# Patient Record
Sex: FEMALE | Race: WHITE | Employment: FULL TIME | URBAN - METROPOLITAN AREA
[De-identification: names, ages, dates, MRNs, and addresses within clinical notes are randomized per-mention and may not be internally consistent; named-entity substitution may affect disease eponyms.]

---

## 2018-12-04 ENCOUNTER — TRANSFERRED RECORDS (OUTPATIENT)
Dept: HEALTH INFORMATION MANAGEMENT | Facility: CLINIC | Age: 38
End: 2018-12-04

## 2019-03-05 ENCOUNTER — MEDICAL CORRESPONDENCE (OUTPATIENT)
Dept: HEALTH INFORMATION MANAGEMENT | Facility: CLINIC | Age: 39
End: 2019-03-05

## 2019-03-05 ENCOUNTER — TRANSFERRED RECORDS (OUTPATIENT)
Dept: HEALTH INFORMATION MANAGEMENT | Facility: CLINIC | Age: 39
End: 2019-03-05

## 2019-03-15 ENCOUNTER — PRE VISIT (OUTPATIENT)
Dept: MATERNAL FETAL MEDICINE | Facility: CLINIC | Age: 39
End: 2019-03-15

## 2019-03-19 ENCOUNTER — TELEPHONE (OUTPATIENT)
Dept: MATERNAL FETAL MEDICINE | Facility: CLINIC | Age: 39
End: 2019-03-19

## 2019-03-19 NOTE — TELEPHONE ENCOUNTER
Patient was referred over to [facility] from [place]. Patient no showed her appt. On 3/19. Writer spoke to Jackie from [place] and she said she will let provider know. Also they will call [facility] back for updates on patient  r/s appt. Or if she declines coming here.        Referring clinic notified.   Rene Thomas,    , [facility]

## 2019-03-21 ENCOUNTER — HOSPITAL ENCOUNTER (OUTPATIENT)
Facility: CLINIC | Age: 39
End: 2019-03-21
Payer: COMMERCIAL

## 2019-04-18 ENCOUNTER — HOSPITAL ENCOUNTER (OUTPATIENT)
Dept: ULTRASOUND IMAGING | Facility: CLINIC | Age: 39
Discharge: HOME | End: 2019-04-18
Attending: MIDWIFE | Admitting: MIDWIFE
Payer: COMMERCIAL

## 2019-04-18 ENCOUNTER — OFFICE VISIT (OUTPATIENT)
Dept: INTERPRETER SERVICES | Facility: CLINIC | Age: 39
End: 2019-04-18
Payer: COMMERCIAL

## 2019-04-18 ENCOUNTER — OFFICE VISIT (OUTPATIENT)
Dept: MATERNAL FETAL MEDICINE | Facility: CLINIC | Age: 39
End: 2019-04-18
Attending: MIDWIFE
Payer: COMMERCIAL

## 2019-04-18 DIAGNOSIS — O26.90 PREGNANCY RELATED CONDITION, ANTEPARTUM: ICD-10-CM

## 2019-04-18 DIAGNOSIS — O09.523 ELDERLY MULTIGRAVIDA IN THIRD TRIMESTER: Primary | ICD-10-CM

## 2019-04-18 DIAGNOSIS — O09.30 POOR PATIENT ATTENDANCE OF ANTENATAL CARE: ICD-10-CM

## 2019-04-18 PROCEDURE — T1013 SIGN LANG/ORAL INTERPRETER: HCPCS | Mod: U3

## 2019-04-18 PROCEDURE — 76811 OB US DETAILED SNGL FETUS: CPT

## 2019-04-18 PROCEDURE — 96040 ZZH GENETIC COUNSELING, EACH 30 MINUTES: CPT | Mod: ZF | Performed by: GENETIC COUNSELOR, MS

## 2019-04-18 NOTE — PROGRESS NOTES
"Please see \"Imaging\" tab under \"Chart Review\" for details of today's US.      Saray Sotelo, DO  Maternal-Fetal Medicine        "

## 2019-04-19 NOTE — PROGRESS NOTES
[facility]  Genetic Counseling Consult    Patient:  Maureen Finnegan YOB: 1980   Date of Service:  19      Maureen Finnegan was seen at the [facility] with a  ( name: Laura Pacheco, Agency: [place], ID# [id number])  for genetic consultation as part of her appointment for comprehensive ultrasound.  The indication for genetic counseling is advanced maternal age.       Impression/Plan:   1. Maureen had a comprehensive (level II) ultrasound today.  Please see the ultrasound report for further details.    2. The patient declines genetic amniocentesis and aneuploidy screening today.    Pregnancy History:   /Parity:      Age at Delivery: 38 year old  MOUNA: 6/15/2019, by Last Menstrual Period  Gestational Age: 31w6d    No significant complications or exposures were reported in the current pregnancy.    Pregnancy history:  o 3 full term deliveries       Family History:   A three-generation pedigree was obtained, and is scanned under the  Media  tab. The reported family history is negative for multiple miscarriages, stillbirths, birth defects, mental retardation, known genetic conditions, and consanguinity.      Risk Assessment for Chromosome Conditions:   We explained that the risk for fetal chromosome abnormalities increases with maternal age. We discussed specific features of common chromosome abnormalities, including Down syndrome, trisomy 13, trisomy 18, and sex chromosome trisomies.    At age 38 at delivery, the risk to have a baby with Down syndrome is 1 in 227.    At age 38 at delivery, the risk to have a baby with any chromosome abnormality is 1 in 129.     Maureen did not have maternal serum screening earlier in pregnancy.       Testing Options:   We discussed the following options:   Non-invasive Prenatal Testing (NIPT)    Maternal plasma cell-free DNA testing;  first trimester ultrasound with nuchal translucency and nasal bone assessment is recommended, when appropriate    Screens for fetal trisomy 21, trisomy 13, trisomy 18, and sex chromosome aneuploidy    Cannot screen for open neural tube defects; maternal serum AFP after 15 weeks is recommended     Genetic Amniocentesis    Invasive procedure typically performed in the second trimester by which amniotic fluid is obtained for the purpose of chromosome analysis and/or other prenatal genetic analysis    Diagnostic results; >99% sensitivity for fetal chromosome abnormalities    AFAFP measurement tests for open neural tube defects     Comprehensive (Level II) ultrasound: Detailed ultrasound performed to screen for major birth defects and markers for aneuploidy.    We reviewed the benefits and limitations of this testing.  Screening tests provide a risk assessment specific to the pregnancy for certain fetal chromosome abnormalities, but cannot definitively diagnose or exclude a fetal chromosome abnormality.  Follow-up genetic counseling and consideration of diagnostic testing is recommended with any abnormal screening result.     Diagnostic tests carry inherent risks- including risk of miscarriage- that require careful consideration.  These tests can detect fetal chromosome abnormalities with greater than 99% certainty.  Results can be compromised by maternal cell contamination or mosaicism, and are limited by the resolution of cytogenetic G-banding technology.  There is no screening nor diagnostic test that can detect all forms of birth defects or mental disability.    It was a pleasure to be involved with Maureen s care. Face-to-face time of the meeting was 35 minutes.    Bella Huynh MS, [facility]  Maternal Fetal Medicine  [facility]  Phone:[phone]  Email: [email or web address]

## 2019-05-09 ENCOUNTER — OFFICE VISIT (OUTPATIENT)
Dept: INTERPRETER SERVICES | Facility: CLINIC | Age: 39
End: 2019-05-09

## 2019-05-09 ENCOUNTER — OFFICE VISIT (OUTPATIENT)
Dept: MATERNAL FETAL MEDICINE | Facility: CLINIC | Age: 39
End: 2019-05-09
Attending: OBSTETRICS & GYNECOLOGY
Payer: COMMERCIAL

## 2019-05-09 ENCOUNTER — HOSPITAL ENCOUNTER (OUTPATIENT)
Dept: ULTRASOUND IMAGING | Facility: CLINIC | Age: 39
Discharge: HOME | End: 2019-05-09
Attending: OBSTETRICS & GYNECOLOGY | Admitting: OBSTETRICS & GYNECOLOGY
Payer: COMMERCIAL

## 2019-05-09 DIAGNOSIS — O35.9XX0 SUSPECTED FETAL ANOMALY, ANTEPARTUM, SINGLE OR UNSPECIFIED FETUS: Primary | ICD-10-CM

## 2019-05-09 DIAGNOSIS — O09.30 POOR PATIENT ATTENDANCE OF ANTENATAL CARE: ICD-10-CM

## 2019-05-09 DIAGNOSIS — O09.523 ELDERLY MULTIGRAVIDA IN THIRD TRIMESTER: ICD-10-CM

## 2019-05-09 PROCEDURE — 76816 OB US FOLLOW-UP PER FETUS: CPT

## 2019-05-09 PROCEDURE — T1013 SIGN LANG/ORAL INTERPRETER: HCPCS | Mod: U3,ZF

## 2019-05-09 NOTE — PROGRESS NOTES
"Please see \"Imaging\" tab under \"Chart Review\" for details of today's US at the [facility].    Pernell Gaviria MD  Maternal-Fetal Medicine      "

## 2019-05-14 ENCOUNTER — HOSPITAL ENCOUNTER (OUTPATIENT)
Facility: CLINIC | Age: 39
Setting detail: OBSERVATION
Discharge: HOME | End: 2019-05-15
Attending: OBSTETRICS & GYNECOLOGY | Admitting: OBSTETRICS & GYNECOLOGY
Payer: COMMERCIAL

## 2019-05-14 DIAGNOSIS — N20.0 NEPHROLITHIASIS: Primary | ICD-10-CM

## 2019-05-14 LAB
APTT PPP: 28 SEC (ref 22–37)
ERYTHROCYTE [DISTWIDTH] IN BLOOD BY AUTOMATED COUNT: 13.4 % (ref 10–15)
FIBRINOGEN PPP-MCNC: 450 MG/DL (ref 200–420)
HCT VFR BLD AUTO: 32.8 % (ref 35–47)
HGB BLD-MCNC: 10.8 G/DL (ref 11.7–15.7)
INR PPP: 1.05 (ref 0.86–1.14)
LACTATE BLD-SCNC: 1 MMOL/L (ref 0.7–2)
MCH RBC QN AUTO: 30.3 PG (ref 26.5–33)
MCHC RBC AUTO-ENTMCNC: 32.9 G/DL (ref 31.5–36.5)
MCV RBC AUTO: 92 FL (ref 78–100)
PLATELET # BLD AUTO: 145 10E9/L (ref 150–450)
RBC # BLD AUTO: 3.57 10E12/L (ref 3.8–5.2)
WBC # BLD AUTO: 8.4 10E9/L (ref 4–11)

## 2019-05-14 PROCEDURE — 25800030 ZZH RX IP 258 OP 636: Performed by: ADVANCED PRACTICE MIDWIFE

## 2019-05-14 PROCEDURE — 96374 THER/PROPH/DIAG INJ IV PUSH: CPT

## 2019-05-14 PROCEDURE — 85730 THROMBOPLASTIN TIME PARTIAL: CPT | Performed by: ADVANCED PRACTICE MIDWIFE

## 2019-05-14 PROCEDURE — 86850 RBC ANTIBODY SCREEN: CPT | Performed by: ADVANCED PRACTICE MIDWIFE

## 2019-05-14 PROCEDURE — 85610 PROTHROMBIN TIME: CPT | Performed by: ADVANCED PRACTICE MIDWIFE

## 2019-05-14 PROCEDURE — 85384 FIBRINOGEN ACTIVITY: CPT | Performed by: ADVANCED PRACTICE MIDWIFE

## 2019-05-14 PROCEDURE — 83690 ASSAY OF LIPASE: CPT | Performed by: STUDENT IN AN ORGANIZED HEALTH CARE EDUCATION/TRAINING PROGRAM

## 2019-05-14 PROCEDURE — 80053 COMPREHEN METABOLIC PANEL: CPT | Performed by: STUDENT IN AN ORGANIZED HEALTH CARE EDUCATION/TRAINING PROGRAM

## 2019-05-14 PROCEDURE — 36415 COLL VENOUS BLD VENIPUNCTURE: CPT | Performed by: STUDENT IN AN ORGANIZED HEALTH CARE EDUCATION/TRAINING PROGRAM

## 2019-05-14 PROCEDURE — 85460 HEMOGLOBIN FETAL: CPT | Performed by: OBSTETRICS & GYNECOLOGY

## 2019-05-14 PROCEDURE — 40000268 ZZH STATISTIC NO CHARGES

## 2019-05-14 PROCEDURE — 83605 ASSAY OF LACTIC ACID: CPT | Performed by: STUDENT IN AN ORGANIZED HEALTH CARE EDUCATION/TRAINING PROGRAM

## 2019-05-14 PROCEDURE — 86901 BLOOD TYPING SEROLOGIC RH(D): CPT | Performed by: ADVANCED PRACTICE MIDWIFE

## 2019-05-14 PROCEDURE — 85027 COMPLETE CBC AUTOMATED: CPT | Performed by: ADVANCED PRACTICE MIDWIFE

## 2019-05-14 PROCEDURE — 86900 BLOOD TYPING SEROLOGIC ABO: CPT | Performed by: ADVANCED PRACTICE MIDWIFE

## 2019-05-14 PROCEDURE — 25000128 H RX IP 250 OP 636

## 2019-05-14 RX ORDER — SODIUM CHLORIDE, SODIUM LACTATE, POTASSIUM CHLORIDE, CALCIUM CHLORIDE 600; 310; 30; 20 MG/100ML; MG/100ML; MG/100ML; MG/100ML
INJECTION, SOLUTION INTRAVENOUS CONTINUOUS
Status: DISCONTINUED | OUTPATIENT
Start: 2019-05-15 | End: 2019-05-15 | Stop reason: HOSPADM

## 2019-05-14 RX ORDER — MISOPROSTOL 200 UG/1
TABLET ORAL
Status: DISPENSED
Start: 2019-05-14 | End: 2019-05-15

## 2019-05-14 RX ORDER — FENTANYL CITRATE 50 UG/ML
50 INJECTION, SOLUTION INTRAMUSCULAR; INTRAVENOUS ONCE
Status: COMPLETED | OUTPATIENT
Start: 2019-05-14 | End: 2019-05-14

## 2019-05-14 RX ORDER — LIDOCAINE HYDROCHLORIDE 10 MG/ML
INJECTION, SOLUTION EPIDURAL; INFILTRATION; INTRACAUDAL; PERINEURAL
Status: DISCONTINUED
Start: 2019-05-14 | End: 2019-05-15 | Stop reason: WASHOUT

## 2019-05-14 RX ORDER — ONDANSETRON 2 MG/ML
4 INJECTION INTRAMUSCULAR; INTRAVENOUS EVERY 6 HOURS PRN
Status: DISCONTINUED | OUTPATIENT
Start: 2019-05-14 | End: 2019-05-15 | Stop reason: HOSPADM

## 2019-05-14 RX ORDER — OXYTOCIN 10 [USP'U]/ML
INJECTION, SOLUTION INTRAMUSCULAR; INTRAVENOUS
Status: DISCONTINUED
Start: 2019-05-14 | End: 2019-05-15 | Stop reason: WASHOUT

## 2019-05-14 RX ORDER — OXYTOCIN/0.9 % SODIUM CHLORIDE 30/500 ML
PLASTIC BAG, INJECTION (ML) INTRAVENOUS
Status: DISCONTINUED
Start: 2019-05-14 | End: 2019-05-15 | Stop reason: WASHOUT

## 2019-05-14 RX ORDER — NALOXONE HYDROCHLORIDE 0.4 MG/ML
.1-.4 INJECTION, SOLUTION INTRAMUSCULAR; INTRAVENOUS; SUBCUTANEOUS
Status: DISCONTINUED | OUTPATIENT
Start: 2019-05-14 | End: 2019-05-15 | Stop reason: HOSPADM

## 2019-05-14 RX ORDER — ALUMINA, MAGNESIA, AND SIMETHICONE 2400; 2400; 240 MG/30ML; MG/30ML; MG/30ML
30 SUSPENSION ORAL
Status: DISCONTINUED | OUTPATIENT
Start: 2019-05-14 | End: 2019-05-15 | Stop reason: HOSPADM

## 2019-05-14 RX ORDER — FENTANYL CITRATE 50 UG/ML
INJECTION, SOLUTION INTRAMUSCULAR; INTRAVENOUS
Status: COMPLETED
Start: 2019-05-14 | End: 2019-05-14

## 2019-05-14 RX ORDER — FENTANYL CITRATE 50 UG/ML
50 INJECTION, SOLUTION INTRAMUSCULAR; INTRAVENOUS
Status: DISCONTINUED | OUTPATIENT
Start: 2019-05-14 | End: 2019-05-15 | Stop reason: HOSPADM

## 2019-05-14 RX ADMIN — SODIUM CHLORIDE, POTASSIUM CHLORIDE, SODIUM LACTATE AND CALCIUM CHLORIDE 500 ML: 600; 310; 30; 20 INJECTION, SOLUTION INTRAVENOUS at 23:58

## 2019-05-14 RX ADMIN — FENTANYL CITRATE 50 MCG: 50 INJECTION, SOLUTION INTRAMUSCULAR; INTRAVENOUS at 23:31

## 2019-05-14 RX ADMIN — FENTANYL CITRATE 50 MCG: 50 INJECTION INTRAMUSCULAR; INTRAVENOUS at 23:31

## 2019-05-14 NOTE — LETTER
May 15, 2019      RE: Maureen Finnegan  [address]    [facility] [address]       To whom it may concern:    Maureen Finnegan was seen on 5/14-5/15/2019. Please excuse her from work on these days.     She is able to return to work on 5/16/2019.         Sincerely,          Tamika Devries MD

## 2019-05-15 ENCOUNTER — APPOINTMENT (OUTPATIENT)
Dept: ULTRASOUND IMAGING | Facility: CLINIC | Age: 39
End: 2019-05-15
Payer: COMMERCIAL

## 2019-05-15 ENCOUNTER — OFFICE VISIT (OUTPATIENT)
Dept: INTERPRETER SERVICES | Facility: CLINIC | Age: 39
End: 2019-05-15
Payer: COMMERCIAL

## 2019-05-15 VITALS
RESPIRATION RATE: 20 BRPM | BODY MASS INDEX: 21.97 KG/M2 | HEART RATE: 73 BPM | HEIGHT: 65 IN | DIASTOLIC BLOOD PRESSURE: 51 MMHG | TEMPERATURE: 98 F | SYSTOLIC BLOOD PRESSURE: 92 MMHG

## 2019-05-15 PROBLEM — R10.9 ABDOMINAL PAIN AFFECTING PREGNANCY: Status: ACTIVE | Noted: 2019-05-15

## 2019-05-15 PROBLEM — O26.899 ABDOMINAL PAIN AFFECTING PREGNANCY: Status: ACTIVE | Noted: 2019-05-15

## 2019-05-15 LAB
ABO + RH BLD: NORMAL
ABO + RH BLD: NORMAL
ALBUMIN SERPL-MCNC: 2.7 G/DL (ref 3.4–5)
ALBUMIN UR-MCNC: NEGATIVE MG/DL
ALP SERPL-CCNC: 170 U/L (ref 40–150)
ALT SERPL W P-5'-P-CCNC: 21 U/L (ref 0–50)
ANION GAP SERPL CALCULATED.3IONS-SCNC: 7 MMOL/L (ref 3–14)
APPEARANCE UR: ABNORMAL
AST SERPL W P-5'-P-CCNC: 18 U/L (ref 0–45)
BACTERIA #/AREA URNS HPF: ABNORMAL /HPF
BILIRUB SERPL-MCNC: 0.2 MG/DL (ref 0.2–1.3)
BILIRUB UR QL STRIP: NEGATIVE
BLD GP AB SCN SERPL QL: NORMAL
BLOOD BANK CMNT PATIENT-IMP: NORMAL
BUN SERPL-MCNC: 9 MG/DL (ref 7–30)
CALCIUM SERPL-MCNC: 8.4 MG/DL (ref 8.5–10.1)
CHLORIDE SERPL-SCNC: 108 MMOL/L (ref 94–109)
CO2 SERPL-SCNC: 22 MMOL/L (ref 20–32)
COLOR UR AUTO: YELLOW
CREAT SERPL-MCNC: 0.6 MG/DL (ref 0.52–1.04)
GFR SERPL CREATININE-BSD FRML MDRD: >90 ML/MIN/{1.73_M2}
GLUCOSE SERPL-MCNC: 112 MG/DL (ref 70–99)
GLUCOSE UR STRIP-MCNC: NEGATIVE MG/DL
HGB UR QL STRIP: NEGATIVE
KETONES UR STRIP-MCNC: NEGATIVE MG/DL
LEUKOCYTE ESTERASE UR QL STRIP: NEGATIVE
LIPASE SERPL-CCNC: 114 U/L (ref 73–393)
MUCOUS THREADS #/AREA URNS LPF: PRESENT /LPF
NITRATE UR QL: NEGATIVE
PH UR STRIP: 6.5 PH (ref 5–7)
POTASSIUM SERPL-SCNC: 3.5 MMOL/L (ref 3.4–5.3)
PROT SERPL-MCNC: 6.6 G/DL (ref 6.8–8.8)
RBC #/AREA URNS AUTO: 5 /HPF (ref 0–2)
SODIUM SERPL-SCNC: 137 MMOL/L (ref 133–144)
SOURCE: ABNORMAL
SP GR UR STRIP: 1.01 (ref 1–1.03)
SPECIMEN EXP DATE BLD: NORMAL
UROBILINOGEN UR STRIP-MCNC: NORMAL MG/DL (ref 0–2)
WBC #/AREA URNS AUTO: 5 /HPF (ref 0–5)

## 2019-05-15 PROCEDURE — 96374 THER/PROPH/DIAG INJ IV PUSH: CPT

## 2019-05-15 PROCEDURE — 96376 TX/PRO/DX INJ SAME DRUG ADON: CPT

## 2019-05-15 PROCEDURE — G0378 HOSPITAL OBSERVATION PER HR: HCPCS

## 2019-05-15 PROCEDURE — 96361 HYDRATE IV INFUSION ADD-ON: CPT

## 2019-05-15 PROCEDURE — 59025 FETAL NON-STRESS TEST: CPT

## 2019-05-15 PROCEDURE — 25000132 ZZH RX MED GY IP 250 OP 250 PS 637: Performed by: STUDENT IN AN ORGANIZED HEALTH CARE EDUCATION/TRAINING PROGRAM

## 2019-05-15 PROCEDURE — G0463 HOSPITAL OUTPT CLINIC VISIT: HCPCS | Mod: 25

## 2019-05-15 PROCEDURE — 96375 TX/PRO/DX INJ NEW DRUG ADDON: CPT

## 2019-05-15 PROCEDURE — 25800030 ZZH RX IP 258 OP 636: Performed by: ADVANCED PRACTICE MIDWIFE

## 2019-05-15 PROCEDURE — T1013 SIGN LANG/ORAL INTERPRETER: HCPCS | Mod: U3

## 2019-05-15 PROCEDURE — 81001 URINALYSIS AUTO W/SCOPE: CPT | Performed by: ADVANCED PRACTICE MIDWIFE

## 2019-05-15 PROCEDURE — 25000128 H RX IP 250 OP 636: Performed by: STUDENT IN AN ORGANIZED HEALTH CARE EDUCATION/TRAINING PROGRAM

## 2019-05-15 PROCEDURE — 96360 HYDRATION IV INFUSION INIT: CPT

## 2019-05-15 PROCEDURE — 76770 US EXAM ABDO BACK WALL COMP: CPT

## 2019-05-15 RX ORDER — ACETAMINOPHEN 325 MG/1
650 TABLET ORAL EVERY 4 HOURS PRN
Status: DISCONTINUED | OUTPATIENT
Start: 2019-05-15 | End: 2019-05-15 | Stop reason: HOSPADM

## 2019-05-15 RX ORDER — SODIUM CHLORIDE, SODIUM LACTATE, POTASSIUM CHLORIDE, CALCIUM CHLORIDE 600; 310; 30; 20 MG/100ML; MG/100ML; MG/100ML; MG/100ML
INJECTION, SOLUTION INTRAVENOUS CONTINUOUS
Status: DISCONTINUED | OUTPATIENT
Start: 2019-05-15 | End: 2019-05-15 | Stop reason: HOSPADM

## 2019-05-15 RX ORDER — OXYCODONE HYDROCHLORIDE 5 MG/1
5-10 TABLET ORAL
Status: DISCONTINUED | OUTPATIENT
Start: 2019-05-15 | End: 2019-05-15 | Stop reason: HOSPADM

## 2019-05-15 RX ORDER — TAMSULOSIN HYDROCHLORIDE 0.4 MG/1
0.4 CAPSULE ORAL DAILY
Qty: 30 CAPSULE | Refills: 0 | Status: SHIPPED | OUTPATIENT
Start: 2019-05-16 | End: 2019-05-15

## 2019-05-15 RX ORDER — OXYCODONE HYDROCHLORIDE 5 MG/1
5 TABLET ORAL EVERY 6 HOURS PRN
Qty: 12 TABLET | Refills: 0 | Status: ON HOLD | OUTPATIENT
Start: 2019-05-15 | End: 2019-06-17

## 2019-05-15 RX ORDER — TAMSULOSIN HYDROCHLORIDE 0.4 MG/1
0.4 CAPSULE ORAL DAILY
Qty: 30 CAPSULE | Refills: 0 | Status: ON HOLD | OUTPATIENT
Start: 2019-05-16 | End: 2019-06-17

## 2019-05-15 RX ORDER — FENTANYL CITRATE 50 UG/ML
100 INJECTION, SOLUTION INTRAMUSCULAR; INTRAVENOUS ONCE
Status: COMPLETED | OUTPATIENT
Start: 2019-05-15 | End: 2019-05-15

## 2019-05-15 RX ORDER — TAMSULOSIN HYDROCHLORIDE 0.4 MG/1
0.4 CAPSULE ORAL DAILY
Status: DISCONTINUED | OUTPATIENT
Start: 2019-05-15 | End: 2019-05-15 | Stop reason: HOSPADM

## 2019-05-15 RX ORDER — TAMSULOSIN HYDROCHLORIDE 0.4 MG/1
0.4 CAPSULE ORAL DAILY
Status: DISCONTINUED | OUTPATIENT
Start: 2019-05-15 | End: 2019-05-15

## 2019-05-15 RX ADMIN — ACETAMINOPHEN 650 MG: 325 TABLET, FILM COATED ORAL at 02:14

## 2019-05-15 RX ADMIN — SODIUM CHLORIDE, POTASSIUM CHLORIDE, SODIUM LACTATE AND CALCIUM CHLORIDE: 600; 310; 30; 20 INJECTION, SOLUTION INTRAVENOUS at 04:03

## 2019-05-15 RX ADMIN — TAMSULOSIN HYDROCHLORIDE 0.4 MG: 0.4 CAPSULE ORAL at 09:04

## 2019-05-15 RX ADMIN — TAMSULOSIN HYDROCHLORIDE 0.4 MG: 0.4 CAPSULE ORAL at 02:21

## 2019-05-15 RX ADMIN — OXYCODONE HYDROCHLORIDE 5 MG: 5 TABLET ORAL at 02:14

## 2019-05-15 RX ADMIN — FENTANYL CITRATE 100 MCG: 50 INJECTION INTRAMUSCULAR; INTRAVENOUS at 00:20

## 2019-05-15 RX ADMIN — FENTANYL CITRATE 50 MCG: 50 INJECTION INTRAMUSCULAR; INTRAVENOUS at 01:19

## 2019-05-15 NOTE — PLAN OF CARE
Patient feeling much better this morning.  Patient given discharge instructions per MD, shown how to strain urine at home. Patient denies questions about new medications and verbalizes understanding. IV removed.  Patient instructed when to call clinic, specifically for concerns of pre-e, labor, decreased fetal movement, vaginal bleeding, or worsening/ongoing urinary symptoms. All discharge instructions were reviewed using an in person , patient discharged ambulatory at 1126.

## 2019-05-15 NOTE — ED NOTES
Pt arrived to ED with complaint of back and abdominal pain .  Pt reports 36 weeks pregnant.   Pt is having contractions No.   Pt feels urge to push Yes.   Pt reports water broke No.   Report was called and pt was transferred to L&D Yes.

## 2019-05-15 NOTE — PROVIDER NOTIFICATION
05/15/19 0025   Provider Notification   Provider Name/Title Dr. Longoria   Method of Notification At Bedside     Plan to perform renal U/S. Ok to come off monitor to transport down to ultrasound.

## 2019-05-15 NOTE — DISCHARGE INSTRUCTIONS
Discharge Instruction for Undelivered Patients      You were seen for: Flank Pain  We Consulted: Barbara  You had (Test or Medicine):Kidney ultrasound     Diet:   Drink 8 to 12 glasses of liquids (milk, juice, water) every day.  You may eat meals and snacks.     Activity:  Call your doctor or nurse midwife if your baby is moving less than usual.     Call your provider if you notice:  Swelling in your face or increased swelling in your hands or legs.  Headaches that are not relieved by Tylenol (acetaminophen).  Changes in your vision (blurring: seeing spots or stars.)  Nausea (sick to your stomach) and vomiting (throwing up).   Weight gain of 5 pounds or more per week.  Heartburn that doesn't go away.  Signs of bladder infection: pain when you urinate (use the toilet), need to go more often and more urgently.  The bag of herrera (rupture of membranes) breaks, or you notice leaking in your underwear.  Bright red blood in your underwear.  Abdominal (lower belly) or stomach pain.  For first baby: Contractions (tightening) less than 5 minutes apart for one hour or more.  Second (plus) baby: Contractions (tightening) less than 10 minutes apart and getting stronger.  *If less than 34 weeks: Contractions (tightenings) more than 6 times in one hour.  Increase or change in vaginal discharge (note the color and amount)  Other:     Follow-up:  As scheduled in the clinic

## 2019-05-15 NOTE — H&P
[facility]   LABOR & DELIVERY   HISTORY AND PHYSICAL    Name: Maureen Finnegan  : 1980  MRN: [id number]    Admit Date: 05/15/2019    CHIEF COMPLAINT:  Abdominal pain, flank pain    HISTORY OF PRESENT ILLNESS:  Maureen Finnegan is a 38 year old  at 35w3d by LMP c/w 12w3d US.  She presents with acute onset of abdominal pain. She states that she was driving and she suddenly had abdominal pain. The pain has been going on for about an hour. She states the pain is constant. It is mostly along her left upper abdomen and wraps around to her back. She has never had anything like this before.  She denies any trauma.  She states she is having regular bowel movements, denies blood/melena. She denies urinary symptoms, has not noticed any blood.  She denies any fevers/chills, nausea/vomiting, CP, SOB, headaches, vision changes.  She denies any sick contacts. She denies contractions, LOF or vaginal bleeding. Hasn't been feeling baby move as much since she has been in pain, but was previously active.     Pregnancy Complicated By:  - Previa (resolved), posterior succenturiate lobe, grand multiparity    OB History:  OB History    Para Term  AB Living   6 5 5 0 0 5   SAB TAB Ectopic Multiple Live Births   0 0 0 0 5      # Outcome Date GA Lbr Darshan/2nd Weight Sex Delivery Anes PTL Lv   6 Current            5 Term 11 40w1d 05:20 / 00:05 3.6 kg (7 lb 15 oz) F Vag-Spont   JACKIE      Name: RAJ FINNEGAN,GIRL BABY      Apgar1: 9  Apgar5: 9   4 Term 2003 40w0d   M Vag-Spont  N JACKIE   3 Term 2001 40w0d   F Vag-Spont  N JACKIE   2 Term 2000 40w0d   F Vag-Spont  N JACKIE   1 Term 1999 40w0d   M Vag-Spont  N JACKIE     Prenatal Lab Results:  Rh: +  SUZANNE: neg  Hgb: 10.8  Rubella: immune  HBSAg: NR  RPR: NR  HIV: NR  GCT: 123  GBS: unknown    Prenatal Ultrasounds:  - 19 @34w5d: placenta anterior with posterior succenturiate lobe, no previa, 3VC, growth  "59%  - 4/18/19 @31w5d: normal anatomy, growth wnl  - [facility] ultrasound results unavailable on Care Everywhere    Problem List:  Patient Active Problem List   Diagnosis     Labor and delivery, indication for care     HISTORY  Past Medical History:  Denies    Past Surgical History:  Denies    Family Medical History:   Non-contributory.     Social History:  Smoking status: denies  EtOH use:  denies  Drugs use: denies    Medications During Pregnancy:  PNV    Allergies:  NKDA    REVIEW OF SYSTEMS:  A 10 point review of systems was completed and was negative other than as noted in the HPI.    PHYSICAL EXAM  Vital Signs:   Patient Vitals for the past 24 hrs:   BP Temp Temp src Pulse Resp Height   05/14/19 2333 109/71 98.4  F (36.9  C) Oral 73 18 1.651 m (5' 5\")     BMI: Body mass index is 21.97 kg/m .    General: NAD  Heart: RRR  Lungs: CTAB  Abdomen: gravid, mild tenderness to palpation in LUQ and L flank, otherwise soft, non-distended, no guarding or rebound, uterus non-tender, no palpable contractions.   Back: No CVA tenderness  Extremities: no edema  Cervix: 1/L/H on CNM check    FHT:  Baseline: 145 bpm, moderate variability, accels +, no decels  [place]: 0 ctx q10min    Labs Ordered/Results Pending:  Results for orders placed or performed during the hospital encounter of 05/14/19   UA with Microscopic reflex to Culture   Result Value Ref Range    Color Urine Yellow     Appearance Urine Slightly Cloudy     Glucose Urine Negative NEG^Negative mg/dL    Bilirubin Urine Negative NEG^Negative    Ketones Urine Negative NEG^Negative mg/dL    Specific Gravity Urine 1.013 1.003 - 1.035    Blood Urine Negative NEG^Negative    pH Urine 6.5 5.0 - 7.0 pH    Protein Albumin Urine Negative NEG^Negative mg/dL    Urobilinogen mg/dL Normal 0.0 - 2.0 mg/dL    Nitrite Urine Negative NEG^Negative    Leukocyte Esterase Urine Negative NEG^Negative    Source Unspecified Urine     WBC Urine 5 0 - 5 /HPF    RBC Urine 5 (H) 0 - 2 /HPF    Bacteria " Urine Moderate (A) NEG^Negative /HPF    Mucous Urine Present (A) NEG^Negative /LPF   INR   Result Value Ref Range    INR 1.05 0.86 - 1.14   Partial thromboplastin time   Result Value Ref Range    PTT 28 22 - 37 sec   Fibrinogen activity   Result Value Ref Range    Fibrinogen 450 (H) 200 - 420 mg/dL   CBC with platelets   Result Value Ref Range    WBC 8.4 4.0 - 11.0 10e9/L    RBC Count 3.57 (L) 3.8 - 5.2 10e12/L    Hemoglobin 10.8 (L) 11.7 - 15.7 g/dL    Hematocrit 32.8 (L) 35.0 - 47.0 %    MCV 92 78 - 100 fl    MCH 30.3 26.5 - 33.0 pg    MCHC 32.9 31.5 - 36.5 g/dL    RDW 13.4 10.0 - 15.0 %    Platelet Count 145 (L) 150 - 450 10e9/L   Comprehensive metabolic panel   Result Value Ref Range    Sodium 137 133 - 144 mmol/L    Potassium 3.5 3.4 - 5.3 mmol/L    Chloride 108 94 - 109 mmol/L    Carbon Dioxide 22 20 - 32 mmol/L    Anion Gap 7 3 - 14 mmol/L    Glucose 112 (H) 70 - 99 mg/dL    Urea Nitrogen 9 7 - 30 mg/dL    Creatinine 0.60 0.52 - 1.04 mg/dL    GFR Estimate >90 >60 mL/min/[1.73_m2]    GFR Estimate If Black >90 >60 mL/min/[1.73_m2]    Calcium 8.4 (L) 8.5 - 10.1 mg/dL    Bilirubin Total 0.2 0.2 - 1.3 mg/dL    Albumin 2.7 (L) 3.4 - 5.0 g/dL    Protein Total 6.6 (L) 6.8 - 8.8 g/dL    Alkaline Phosphatase 170 (H) 40 - 150 U/L    ALT 21 0 - 50 U/L    AST 18 0 - 45 U/L   Lipase   Result Value Ref Range    Lipase 114 73 - 393 U/L   Lactic acid whole blood   Result Value Ref Range    Lactic Acid 1.0 0.7 - 2.0 mmol/L   ABO/Rh type and screen   Result Value Ref Range    ABO O     RH(D) Pos     Antibody Screen Neg     Test Valid Only At          [facility]    Specimen Expires 2019      ASSESSMENT:  38 year old  at 35w3d who presents to L&D for acute onset of abdominal pain. Differential is broad at this point, but it seems the pain is most consistent with a kidney stone. Abruption unlikely without trauma, vaginal bleeding or contractions and category 1 fetal  tracing. Coags are wnl.  Cervix is 1cm without contractions, so not in labor. She is also afebrile with a normal WBC, so infectious process like appendicitis, pyelonephritis, diverticulitis less likely. No fundal tenderness so intrauterine infection unlikely. No symptoms consistent with ulcer/GI bleed and Hgb normal for pregnancy at 10.8.  She has regular bowel movements and no nausea/vomiting, so constipation, SBO or viral process unlikely. We will admit for further work up and pain control as her pain continues to be quite significant. Overall labs are reassuring. She does have some RBCs in her urine, which again could be consistent with a kidney stone.     PLAN:    # Abdominal pain: DDx as above.   - Treating pain with PRN IV fentanyl  - Will keep NPO overnight pending work-up  - Will start with a renal ultrasound, would like to avoid CT if possible  - Renal ultrasound ordered    # Prenatal Care:  - Rh status: +, Rubella immune, Hep BSAg NR, GCT: 123, Placenta: anterior with posterior succenturiate lobe.    # Fetal Well Being:  - FHT Category I, reactive and reassuring  - Continue EFM while taking fentanyl    Discussed with Dr. Hugo Longoria MD  OBGYN PGY2  05/15/2019 12:52 AM    Appreciate Dr. Longoria's note above, patient also seen and examined by me. I agree with the note above.   Niesha Arias MD

## 2019-05-15 NOTE — PROGRESS NOTES
Patient feeling much improved now. Pain controlled with oxycodone; not requiring further IV medications. Discussed plan to strain urine at home and continue Flomax for rest of pregnancy. If worsening/uncontrolled pain, then urology would plan on US-guided L PNT placement.     Plan to discharge with oxycodone and flomax, as well as urine strainer.    Tamika Devries MD  OB/Gyn PGY-2  5/15/2019    Staff MD Note    I appreciate the note by Dr. Devries.  Any necessary changes have been made by me.  I evaluated the patient with the resident and agree with the assessment and plan.  Pain improved with Flomax and oxycodone.  Per Urology no intervention unless fails medical pain management.  Discussed plan with patient and she understands.  Will have patient come back with pain not controlled with current interventions.    Nga Jimenez MD

## 2019-05-15 NOTE — PROVIDER NOTIFICATION
05/15/19 0158   Provider Notification   Provider Name/Title Dr. Longoria   Method of Notification Phone     Provider returned call. Placed order for urology consult. Urologist will come assess pt when out of OR. Provider to order Flomax and PO roxicodone. Pt can continue to get IV fentanyl PRN. Reviewed strip with provider - contractions noted with irritability. Pt sleeping through contractions at this time.

## 2019-05-15 NOTE — PROVIDER NOTIFICATION
05/14/19 0434   Provider Notification   Provider Name/Title Dr. Arias, Dr. Longoria   Method of Notification At Bedside   Request Evaluate - Remote   Notification Reason Status Update;Patient Arrived     Providers at bedside, IPAD . Plan to draw bloodwork and give 50 mcg of fentanyl.

## 2019-05-15 NOTE — DISCHARGE SUMMARY
[facility]  Antepartum Discharge Summary    Admission Date:  2019   Discharge Date:  5/15/2019     Admission Attending: Niesha Arias MD  Discharge Attending: Nga Jimenez MD    Admission Diagnosis:  - IUP at 35w3d  - Acute onset abdominal pain  - Posterior succenturiate lobe     Discharge Diagnosis:  - Same  - Suspected nephrolithiasis  - Moderate to severe left hydronephrosis    Procedures Performed:  - Renal ultrasound    Consults:  Urology    Admission History:  Maureen Finnegan is a 38 year old  at 35w3d by LMP c/w 12w3d US.  She presents with acute onset of abdominal pain. She states that she was driving and she suddenly had abdominal pain. The pain has been going on for about an hour. She states the pain is constant. It is mostly along her left upper abdomen and wraps around to her back. She has never had anything like this before.  She denies any trauma.  She states she is having regular bowel movements, denies blood/melena. She denies urinary symptoms, has not noticed any blood.  She denies any fevers/chills, nausea/vomiting, CP, SOB, headaches, vision changes.  She denies any sick contacts. She denies contractions, LOF or vaginal bleeding. Hasn't been feeling baby move as much since she has been in pain, but was previously active.     Hospital Course:  She underwent a renal ultrasound which showed moderate to severe left hydronephrosis. She was managed symptomatically with initially IV pain medications and IV fluid hydration. Urology evaluated her and started tamsulosin and recommended symptomatic management, reserving a plan for US-guided PNT placement only if pain could not be controlled. Her pain improved and was controlled on PO oxycodone and tylenol at the time of discharge. Her fetus had a Category I FHT during her admission.     Discharge Plans:  - The patient was discharged to home  - Instructed   - PO Activity:   - No driving while taking narcotics    - She was instructed to call or return to ED if she has any of the following:    - Temperature greater than 100.4F   - Pain not controlled by pain medications   - Uncontrolled nausea/vomiting   - Vaginal bleeding soaking 1 pad per hour for 2 hours in a row     - She will follow up with her regular OB provider on 5/21. She will follow up with urology as needed if her flank pain does not resolve with conservative measures with possible repeat ultrasound after delivery.     - Discharge medications include:     Review of your medicines      START taking      Dose / Directions   oxyCODONE 5 MG tablet  Commonly known as:  ROXICODONE  Used for:  Nephrolithiasis      Dose:  5 mg  Take 1 tablet (5 mg) by mouth every 6 hours as needed for moderate to severe pain  Quantity:  12 tablet  Refills:  0     tamsulosin 0.4 MG capsule  Commonly known as:  FLOMAX  Used for:  Nephrolithiasis      Dose:  0.4 mg  Start taking on:  5/16/2019  Take 1 capsule (0.4 mg) by mouth daily  Quantity:  30 capsule  Refills:  0        CONTINUE these medicines which have NOT CHANGED      Dose / Directions   docusate sodium 100 MG capsule  Commonly known as:  COLACE  Used for:  Active labor      Dose:  100 mg  Take 1 capsule by mouth 2 times daily.  Quantity:  100 capsule  Refills:  1     Prenatal vitamin  s (Dis) Tabs  Used for:  Active labor      Dose:  1 tablet  Take 1 tablet by mouth daily.  Quantity:  30 tablet  Refills:  1           Where to get your medicines      These medications were sent to [facility] [id number] AT [address] [address]    Phone:  [phone]     tamsulosin 0.4 MG capsule     Some of these will need a paper prescription and others can be bought over the counter. Ask your nurse if you have questions.    Bring a paper prescription for each of these medications    oxyCODONE 5 MG tablet           Tamika Devries MD  OB/Gyn  PGY-2  5/15/2019    Staff MD Note    I evaluated the patient on the day of discharge.  We reviewed discharge instructions.  Patient stable for discharge.    Nga Jimenez MD

## 2019-05-15 NOTE — PROGRESS NOTES
Called to room 44 for potential delivery of 35w 3d . Pt c/o diffuse and severe abdominal pain, especially on left side. SVE 1/long/high. No contractions, no LOF, vaginal bleeding, or decrease in fetal movement. Pain is constant and started one hour ago with no precipitating injury or activity. MDs paged to room to assess.  VERÓNICA to MD team per Dr. Arias.     Orin Lira CNM

## 2019-05-15 NOTE — CONSULTS
Urology Consult    Name: Maureen Finnegan    MRN: [id number]   YOB: 1980       We were asked to see Maureen Finnegan at the request of Dr. Gray for evaluation and treatment of the following chief complaint.        Chief Complaint:   Left Hydronephrosis and left flank pain     History is obtained from the patient          History of Present Illness:   Maureen Finnegan is a 38 year old Chinese speaking  at 35w3d with no past medical or urologic history who we are consulted for evaluation of left flank pain.  Patient presented to the ED yesterday evening with sudden onset of left acute flank pain with associated nausea vomiting.  She denied any trauma dysuria or hematuria at that time.  In the ED, patient is afebrile, normal creatinine, without evidence of leukocytosis.  UA showed 5 RBCs and some bacteria but otherwise negative.  An ultrasound was obtained which showed moderate to severe left-sided hydronephrosis with no obvious stone seen.  Patient does not have a history of stones or any other urologic conditions.  She was admitted for pain control and states that since implementing her current pain regimen her pain is better tolerated.  She is also started on Flomax as well.          Past Medical History:     Past Medical History:   Diagnosis Date     PPD positive     cxr negative            Past Surgical History:   History reviewed. No pertinent surgical history.         Social History:     Social History     Tobacco Use     Smoking status: Never Smoker     Smokeless tobacco: Never Used   Substance Use Topics     Alcohol use: No            Family History:   History reviewed. No pertinent family history.         Allergies:   No Known Allergies         Medications:     Current Facility-Administered Medications   Medication     acetaminophen (TYLENOL) tablet 650 mg     alum & mag hydroxide-simethicone (MYLANTA ES/MAALOX  ES) suspension 30 mL     fentaNYL  (PF) (SUBLIMAZE) injection 50 mcg     lactated ringers infusion     lactated ringers infusion     naloxone (NARCAN) injection 0.1-0.4 mg     NO Rho (D) immune globulin (RhoGam) needed - mother Rh NEGATIVE - NOT Clinically indicated at this time     ondansetron (ZOFRAN) injection 4 mg     oxyCODONE (ROXICODONE) tablet 5-10 mg     tamsulosin (FLOMAX) capsule 0.4 mg     Current Outpatient Medications   Medication Sig     oxyCODONE (ROXICODONE) 5 MG tablet Take 1 tablet (5 mg) by mouth every 6 hours as needed for moderate to severe pain     Prenatal Vitamins (DIS) TABS Take 1 tablet by mouth daily.     [START ON 5/16/2019] tamsulosin (FLOMAX) 0.4 MG capsule Take 1 capsule (0.4 mg) by mouth daily     docusate sodium (COLACE) 100 MG capsule Take 1 capsule by mouth 2 times daily.             Review of Systems:    ROS: 10 point ROS neg other than the symptoms noted above in the HPI           Physical Exam:   VS:  T: 98    HR: 73    BP: 92/51    RR: 20   GEN:  AOx3.  NAD.    CV:  RRR  LUNGS: Non-labored breathing.   BACK:  No midline or CVA tenderness.  ABD:  Soft.  NT.  ND.  No rebound or guarding.  No masses.  SKIN:  Warm.  Dry.  No rashes.  NEURO:  CN grossly intact.            Data:   All laboratory data reviewed:    Recent Labs   Lab 05/14/19  2315   WBC 8.4   HGB 10.8*   *     Recent Labs   Lab 05/14/19  2335      POTASSIUM 3.5   CHLORIDE 108   CO2 22   BUN 9   CR 0.60   *   MICHELA 8.4*     Recent Labs   Lab 05/15/19  0012   COLOR Yellow   APPEARANCE Slightly Cloudy   URINEGLC Negative   URINEBILI Negative   URINEKETONE Negative   SG 1.013   URINEPH 6.5   PROTEIN Negative   NITRITE Negative   LEUKEST Negative   RBCU 5*   WBCU 5       All pertinent imaging reviewed:    Results for orders placed or performed during the hospital encounter of 05/14/19   US Renal Complete    Narrative    EXAMINATION: US RENAL COMPLETE, 5/15/2019 12:53 AM     COMPARISON: None.    HISTORY: Severe abdominal pain in  pregnancy, not labored. Suspect  kidney stone    FINDINGS:    Right kidney: Measures 12.4 cm in length. Parenchyma is of normal  thickness and echogenicity. No focal mass. No hydronephrosis. No renal  calculus.    Left kidney: Measures 12.5 cm in length. Parenchyma is of normal  thickness and echogenicity. No focal mass. Moderate left  hydronephrosis. No renal calculus identified, but this does not rule  out a stone.    Bladder: Incompletely evaluated        Impression    IMPRESSION:  Moderate to severe left hydronephrosis.    I have personally reviewed the examination and initial interpretation  and I agree with the findings.    RADHA BARRETT MD                Impression and Plan:   Impression:   Maureen Finnegan is a 38 year old -0-0-5 at 31 weeks 3 days by last menstrual period admitted with left renal colic secondary to left hydronephrosis of unknown etiology.  At this time leading diagnosis would include a ureteral stone due to the sudden onset of symptoms.  But in light of pregnancy unable to obtain a CT scan for definitive diagnosis.  Due to this, we would recommend conservative management i.e. pain control, straining urine, staying well-hydrated-with plans for follow-up as an outpatient with a repeat renal ultrasound at time of delivery to reassess.  If renal ultrasound still shows persistent hydronephrosis with then elected for CT noncontrast for definitive anatomic work-up.  If patient were to fail conservative management we would then recommend placement of a left-sided percutaneous nephrostomy tube with ultrasound guidance by interventional radiology.      Plan:     -No acute urologic intervention indicated at this time    -Continue conservative management    -Please obtain renal ultrasound immediately postpartum to assess if hydro-is still present  -If patient fails conservative treatment, would then recommend nephrostomy tube placement with plan for definitive diagnostic work-up  after birth of her child  -Please send patient home with strainer and continue on Flomax 0.4 mg daily    - Urology will sign off. Please contact resident/PA on call with any questions or concerns.         This patient's exam findings, labs, and imaging discussed with urology staff surgeon Dr. Field, who developed the treatment plan.    Sharron Covarrubias MD  Urology Resident PGY-4

## 2019-05-16 LAB — HGB F BLD QL KLEIH BETKE: NORMAL

## 2019-05-17 LAB — HGB F BLD QL KLEIH BETKE: NORMAL

## 2019-06-16 ENCOUNTER — HOSPITAL ENCOUNTER (INPATIENT)
Facility: CLINIC | Age: 39
LOS: 1 days | Discharge: HOME | End: 2019-06-17
Attending: FAMILY MEDICINE | Admitting: FAMILY MEDICINE
Payer: COMMERCIAL

## 2019-06-16 DIAGNOSIS — Z30.09 GENERAL COUNSELING FOR PRESCRIPTION OF ORAL CONTRACEPTIVES: Primary | ICD-10-CM

## 2019-06-16 PROBLEM — Z78.9 NOT IMMUNE TO HEPATITIS B VIRUS: Status: ACTIVE | Noted: 2019-03-07

## 2019-06-16 PROBLEM — O09.899 SUPERVISION OF OTHER HIGH RISK PREGNANCIES, UNSPECIFIED TRIMESTER: Status: ACTIVE | Noted: 2018-11-28

## 2019-06-16 PROBLEM — O09.529 ANTEPARTUM MULTIGRAVIDA OF ADVANCED MATERNAL AGE: Status: ACTIVE | Noted: 2018-11-30

## 2019-06-16 PROBLEM — E55.9 VITAMIN D DEFICIENCY: Status: ACTIVE | Noted: 2018-12-11

## 2019-06-16 PROBLEM — O09.32 INSUFFICIENT PRENATAL CARE, SECOND TRIMESTER: Status: ACTIVE | Noted: 2019-03-07

## 2019-06-16 LAB
ABO + RH BLD: NORMAL
ABO + RH BLD: NORMAL
HGB BLD-MCNC: 12.8 G/DL (ref 11.7–15.7)
PLATELET # BLD AUTO: 158 10E9/L (ref 150–450)
SPECIMEN EXP DATE BLD: NORMAL
T PALLIDUM AB SER QL: NONREACTIVE

## 2019-06-16 PROCEDURE — 85018 HEMOGLOBIN: CPT | Performed by: FAMILY MEDICINE

## 2019-06-16 PROCEDURE — G0463 HOSPITAL OUTPT CLINIC VISIT: HCPCS

## 2019-06-16 PROCEDURE — 10907ZC DRAINAGE OF AMNIOTIC FLUID, THERAPEUTIC FROM PRODUCTS OF CONCEPTION, VIA NATURAL OR ARTIFICIAL OPENING: ICD-10-PCS | Performed by: FAMILY MEDICINE

## 2019-06-16 PROCEDURE — 72200001 ZZH LABOR CARE VAGINAL DELIVERY SINGLE

## 2019-06-16 PROCEDURE — 86780 TREPONEMA PALLIDUM: CPT | Performed by: FAMILY MEDICINE

## 2019-06-16 PROCEDURE — 25000132 ZZH RX MED GY IP 250 OP 250 PS 637: Performed by: FAMILY MEDICINE

## 2019-06-16 PROCEDURE — 85049 AUTOMATED PLATELET COUNT: CPT | Performed by: FAMILY MEDICINE

## 2019-06-16 PROCEDURE — 25000125 ZZHC RX 250: Performed by: FAMILY MEDICINE

## 2019-06-16 PROCEDURE — 12000001 ZZH R&B MED SURG/OB UMMC

## 2019-06-16 PROCEDURE — 86901 BLOOD TYPING SEROLOGIC RH(D): CPT | Performed by: FAMILY MEDICINE

## 2019-06-16 PROCEDURE — 86900 BLOOD TYPING SEROLOGIC ABO: CPT | Performed by: FAMILY MEDICINE

## 2019-06-16 RX ORDER — LIDOCAINE HYDROCHLORIDE 10 MG/ML
INJECTION, SOLUTION EPIDURAL; INFILTRATION; INTRACAUDAL; PERINEURAL
Status: DISCONTINUED
Start: 2019-06-16 | End: 2019-06-16 | Stop reason: HOSPADM

## 2019-06-16 RX ORDER — LANOLIN 100 %
OINTMENT (GRAM) TOPICAL
Status: DISCONTINUED | OUTPATIENT
Start: 2019-06-16 | End: 2019-06-17 | Stop reason: HOSPADM

## 2019-06-16 RX ORDER — IBUPROFEN 800 MG/1
800 TABLET, FILM COATED ORAL
Status: COMPLETED | OUTPATIENT
Start: 2019-06-16 | End: 2019-06-16

## 2019-06-16 RX ORDER — OXYTOCIN/0.9 % SODIUM CHLORIDE 30/500 ML
PLASTIC BAG, INJECTION (ML) INTRAVENOUS
Status: DISCONTINUED
Start: 2019-06-16 | End: 2019-06-16 | Stop reason: HOSPADM

## 2019-06-16 RX ORDER — MISOPROSTOL 200 UG/1
400 TABLET ORAL
Status: DISCONTINUED | OUTPATIENT
Start: 2019-06-16 | End: 2019-06-17 | Stop reason: HOSPADM

## 2019-06-16 RX ORDER — METHYLERGONOVINE MALEATE 0.2 MG/ML
200 INJECTION INTRAVENOUS
Status: DISCONTINUED | OUTPATIENT
Start: 2019-06-16 | End: 2019-06-17 | Stop reason: HOSPADM

## 2019-06-16 RX ORDER — ACETAMINOPHEN 325 MG/1
650 TABLET ORAL EVERY 4 HOURS PRN
Status: DISCONTINUED | OUTPATIENT
Start: 2019-06-16 | End: 2019-06-16

## 2019-06-16 RX ORDER — NALOXONE HYDROCHLORIDE 0.4 MG/ML
.1-.4 INJECTION, SOLUTION INTRAMUSCULAR; INTRAVENOUS; SUBCUTANEOUS
Status: DISCONTINUED | OUTPATIENT
Start: 2019-06-16 | End: 2019-06-17 | Stop reason: HOSPADM

## 2019-06-16 RX ORDER — CARBOPROST TROMETHAMINE 250 UG/ML
250 INJECTION, SOLUTION INTRAMUSCULAR
Status: DISCONTINUED | OUTPATIENT
Start: 2019-06-16 | End: 2019-06-16

## 2019-06-16 RX ORDER — HYDROCORTISONE 2.5 %
CREAM (GRAM) TOPICAL 3 TIMES DAILY PRN
Status: DISCONTINUED | OUTPATIENT
Start: 2019-06-16 | End: 2019-06-17 | Stop reason: HOSPADM

## 2019-06-16 RX ORDER — OXYTOCIN 10 [USP'U]/ML
10 INJECTION, SOLUTION INTRAMUSCULAR; INTRAVENOUS
Status: DISCONTINUED | OUTPATIENT
Start: 2019-06-16 | End: 2019-06-17 | Stop reason: HOSPADM

## 2019-06-16 RX ORDER — MISOPROSTOL 200 UG/1
TABLET ORAL
Status: DISCONTINUED
Start: 2019-06-16 | End: 2019-06-16 | Stop reason: HOSPADM

## 2019-06-16 RX ORDER — OXYTOCIN/0.9 % SODIUM CHLORIDE 30/500 ML
100 PLASTIC BAG, INJECTION (ML) INTRAVENOUS CONTINUOUS
Status: DISCONTINUED | OUTPATIENT
Start: 2019-06-16 | End: 2019-06-17 | Stop reason: HOSPADM

## 2019-06-16 RX ORDER — AMOXICILLIN 250 MG
2 CAPSULE ORAL 2 TIMES DAILY
Status: DISCONTINUED | OUTPATIENT
Start: 2019-06-16 | End: 2019-06-17 | Stop reason: HOSPADM

## 2019-06-16 RX ORDER — NALOXONE HYDROCHLORIDE 0.4 MG/ML
.1-.4 INJECTION, SOLUTION INTRAMUSCULAR; INTRAVENOUS; SUBCUTANEOUS
Status: DISCONTINUED | OUTPATIENT
Start: 2019-06-16 | End: 2019-06-16

## 2019-06-16 RX ORDER — CARBOPROST TROMETHAMINE 250 UG/ML
250 INJECTION, SOLUTION INTRAMUSCULAR
Status: DISCONTINUED | OUTPATIENT
Start: 2019-06-16 | End: 2019-06-17 | Stop reason: HOSPADM

## 2019-06-16 RX ORDER — SODIUM CHLORIDE, SODIUM LACTATE, POTASSIUM CHLORIDE, CALCIUM CHLORIDE 600; 310; 30; 20 MG/100ML; MG/100ML; MG/100ML; MG/100ML
INJECTION, SOLUTION INTRAVENOUS CONTINUOUS
Status: DISCONTINUED | OUTPATIENT
Start: 2019-06-16 | End: 2019-06-16

## 2019-06-16 RX ORDER — LIDOCAINE 40 MG/G
CREAM TOPICAL
Status: DISCONTINUED | OUTPATIENT
Start: 2019-06-16 | End: 2019-06-16

## 2019-06-16 RX ORDER — ACETAMINOPHEN 325 MG/1
650 TABLET ORAL EVERY 4 HOURS PRN
Status: DISCONTINUED | OUTPATIENT
Start: 2019-06-16 | End: 2019-06-17 | Stop reason: HOSPADM

## 2019-06-16 RX ORDER — FENTANYL CITRATE 50 UG/ML
50-100 INJECTION, SOLUTION INTRAMUSCULAR; INTRAVENOUS
Status: DISCONTINUED | OUTPATIENT
Start: 2019-06-16 | End: 2019-06-16

## 2019-06-16 RX ORDER — AMOXICILLIN 250 MG
1 CAPSULE ORAL 2 TIMES DAILY
Status: DISCONTINUED | OUTPATIENT
Start: 2019-06-16 | End: 2019-06-17 | Stop reason: HOSPADM

## 2019-06-16 RX ORDER — ONDANSETRON 2 MG/ML
4 INJECTION INTRAMUSCULAR; INTRAVENOUS EVERY 6 HOURS PRN
Status: DISCONTINUED | OUTPATIENT
Start: 2019-06-16 | End: 2019-06-16

## 2019-06-16 RX ORDER — OXYTOCIN 10 [USP'U]/ML
INJECTION, SOLUTION INTRAMUSCULAR; INTRAVENOUS
Status: DISCONTINUED
Start: 2019-06-16 | End: 2019-06-16 | Stop reason: HOSPADM

## 2019-06-16 RX ORDER — OXYTOCIN/0.9 % SODIUM CHLORIDE 30/500 ML
340 PLASTIC BAG, INJECTION (ML) INTRAVENOUS CONTINUOUS PRN
Status: DISCONTINUED | OUTPATIENT
Start: 2019-06-16 | End: 2019-06-17 | Stop reason: HOSPADM

## 2019-06-16 RX ORDER — IBUPROFEN 800 MG/1
800 TABLET, FILM COATED ORAL EVERY 6 HOURS PRN
Status: DISCONTINUED | OUTPATIENT
Start: 2019-06-16 | End: 2019-06-17 | Stop reason: HOSPADM

## 2019-06-16 RX ORDER — BISACODYL 10 MG
10 SUPPOSITORY, RECTAL RECTAL DAILY PRN
Status: DISCONTINUED | OUTPATIENT
Start: 2019-06-18 | End: 2019-06-17 | Stop reason: HOSPADM

## 2019-06-16 RX ORDER — METHYLERGONOVINE MALEATE 0.2 MG/ML
200 INJECTION INTRAVENOUS
Status: DISCONTINUED | OUTPATIENT
Start: 2019-06-16 | End: 2019-06-16

## 2019-06-16 RX ORDER — OXYTOCIN 10 [USP'U]/ML
10 INJECTION, SOLUTION INTRAMUSCULAR; INTRAVENOUS
Status: DISCONTINUED | OUTPATIENT
Start: 2019-06-16 | End: 2019-06-16

## 2019-06-16 RX ORDER — OXYTOCIN/0.9 % SODIUM CHLORIDE 30/500 ML
100-340 PLASTIC BAG, INJECTION (ML) INTRAVENOUS CONTINUOUS PRN
Status: COMPLETED | OUTPATIENT
Start: 2019-06-16 | End: 2019-06-16

## 2019-06-16 RX ORDER — OXYCODONE AND ACETAMINOPHEN 5; 325 MG/1; MG/1
1 TABLET ORAL
Status: DISCONTINUED | OUTPATIENT
Start: 2019-06-16 | End: 2019-06-16

## 2019-06-16 RX ADMIN — Medication 340 ML/HR: at 12:57

## 2019-06-16 RX ADMIN — SENNOSIDES AND DOCUSATE SODIUM 1 TABLET: 8.6; 5 TABLET ORAL at 19:25

## 2019-06-16 RX ADMIN — IBUPROFEN 800 MG: 800 TABLET, FILM COATED ORAL at 13:14

## 2019-06-16 NOTE — PLAN OF CARE
Data: Maureen Finnegan transferred to [facility] via wheelchair at 1500. Baby transferred via parent's arms.  Action: Receiving unit notified of transfer: Yes. Patient and family notified of room change. Report given to Clarisa HOUSTON at 1510. Belongings sent to receiving unit. Accompanied by Registered Nurse. Oriented patient to surroundings. Call light within reach. ID bands double-checked with receiving RN.  Response: Patient tolerated transfer and is stable.

## 2019-06-16 NOTE — PLAN OF CARE
Patient arrived to [facility] unit via wheelchair at 1510 ,with belongings, accompanied by family, with infant in arms.  Clarisa VO RN took report from Noreen CONTRERAS RN  and Krys HOUSTON checked bands with Noreen. Unit and room orientation completd. Call light within arms reach; no concerns present at this time. Continue with plan of care.

## 2019-06-16 NOTE — L&D DELIVERY NOTE
OB Vaginal Delivery Note    Maureen Finnegan MRN# [id number]   Age: 38 year old YOB: 1980       GA: 40w1d  GP:   Labor Complications: None   EBL: 300  mL  QBL:  Done, but result pending  Delivery Type: Vaginal, Spontaneous   ROM to Delivery Time: 2h 08m   Weight:      1 Minute 5 Minute 10 Minute   Apgar Totals:    9    9       TISHA ISLAS;ANIL ABDUL     Delivery Details:  Maureen Finnegan, a 38 year old  female delivered a viable infant with apgars of 9 and 9. Patient was fully dilated and pushing after 5  hours 17  minutes in active labor. Delivery was via vaginal, spontaneous  to a sterile field under no anesthesia. Infant delivered in vertex  right  occiput  anterior  position. Anterior and posterior shoulders delivered without difficulty. IV pitocin started after delivery of the infant. The cord was clamped, cut twice and 3 vessels  were noted. Cord blood was obtained in routine fashion with the following disposition: lab .      Cord complications: none   Placenta delivered at 2019 12:59 PM . Placental disposition was Hospital disposal . Fundal massage performed and fundus found to be firm.     Episiotomy: none    Perineum, vagina, cervix were inspected, and the following lacerations were noted:   Perineal lacerations: none     Excellent hemostasis was noted. Sponge and needle count correct. Infant and patient in delivery room in good and stable condition.        Labor Event Times    Labor onset date:  19 Onset time:   7:00 AM   Dilation complete date:  19 Complete time:  12:17 PM   Start pushing date/time:  2019 1226      Labor Length    1st Stage (hrs):  5 (min):  17   2nd Stage (hrs):  0 (min):  38   3rd Stage (hrs):  0 (min):  4      Labor Events     labor?:  No   steroids:  None  Labor Type:  Spontaneous  Predominate monitoring during 1st stage:  continuous electronic fetal monitoring     Antibiotics  received during labor?:  No     Rupture identifier:  Sac 1  Rupture date/time: 19 1047   Rupture type:  Artificial Rupture of Membranes  Fluid color:  Clear  Fluid odor:  Normal     1:1 continuous labor support provided by?:  RN Labor partogram used?:  no      Delivery/Placenta Date and Time    Delivery Date:  19 Delivery Time:  12:55 PM   Placenta Date/Time:  2019 12:59 PM     Vaginal Counts     Initial count performed by 2 team members:   Two Team Members   Dr. Jeet Silva RN       [place] [employer] [place] [employer]   Initial counts 2 0 5    Added to count 0 0 0    Final counts 2 0 5    Placed during labor Accounted for at the end of labor   No NA   No NA   No NA    Final count performed by 2 team members:   Two Team Members   Dr. Jeet Silva RN      Final count correct?:  Yes     Apgars    Living status:  Living   1 Minute 5 Minute   Skin color:     Heart rate:     Reflex irritability:     Muscle tone:     Respiratory effort:     Total: 9 9      Cord    Vessels:  3 Vessels Complications:  None   Cord Blood Disposition:  Lab Gases Sent?:  No      [place] Resuscitation    Methods:  None  Output in Delivery Room:  Stool     Skin to Skin and Feeding Plan    Skin to skin initiation date/time: 1841    Skin to skin with:  Mother  Skin to skin end date/time: 1841    How do you plan to feed your baby:  Breastfeeding     Labor Events and Shoulder Dystocia    Fetal Tracing Prior to Delivery:  Category 1  Shoulder dystocia present?:  Neg     Delivery (Maternal) (Provider to Complete) ([id number])    Episiotomy:  None  Perineal lacerations:  None    Vaginal laceration?:  No    Cervical laceration?:  No    Est. blood loss (mL):  300     Blood Loss  Mother: Maureen Jenkins #[id number]   Start of Mother's Information    IO Blood Loss  19 0700 - 19 1314    EBL (mL) Hospital Encounter 300 mL    Total  300 mL         End of Mother's Information  Mother:  Maureen Jenkins #[id number]         Delivery - Provider to Complete ([id number])    Delivering clinician:  Ina Marcano MD  Attempted Delivery Types (Choose all that apply):  Spontaneous Vaginal Delivery  Delivery Type (Choose the 1 that will go to the Birth History):  Vaginal, Spontaneous   Other personnel:   Provider Role   Ina Marcano MD MD Thompson, Cassandra, RN Registered Nurse         Placenta    Delayed Cord Clamping:  Done  Date/Time:  6/16/2019 12:59 PM  Removal:  Spontaneous  Disposition:  Hospital disposal     Anesthesia    Method:  None          Presentation and Position    Presentation:  Vertex  Position:  Right Occiput Anterior           Ina Marcano MD

## 2019-06-16 NOTE — PROGRESS NOTES
"[facility]  Intrapartum Progress Note  2019 10:57 AM  Date of service: 2019.    SUBJECTIVE:  Doing well. Patient reports contractions every 4-5 minutes and denies vaginal bleeding or loss of fluids. Fetal movement is Normal.    OBJECTIVE:   Patient Vitals for the past 8 hrs:   BP Temp Temp src Resp Height   19 1055 125/76 -- -- 20 --   19 0930 -- -- -- -- 1.651 m (5' 5\")   19 0924 110/72 97.5  F (36.4  C) Oral 20 --     Gen: uncomfortable, but on hands and knees with good labor support    Sterile Vaginal Exam:  Membranes: AROM, clear  Cervix: 6/90/0   Consistency: soft  Presentation:Cephalic    FHT: Baseline 140 bpm. Variability is  moderate (5-25 bpm),  Accelerations are present, decelerations are absent  TOCO: Contractions every 4-5 minutes (though hard to  due to position changes)  Tracing is Category 1        .    ASSESSMENT and PLAN:  Maureen Finnegan is a 38 year old  at 40w1d in active labor.   Patient Active Problem List   Diagnosis     Labor and delivery, indication for care     Abdominal pain affecting pregnancy     Antepartum multigravida of advanced maternal age     Insufficient prenatal care, second trimester     Not immune to hepatitis B virus     Supervision of other high risk pregnancies, unspecified trimester     Vitamin D deficiency       1.  Labor: spontaneous. Progressing well.  AROM completed.       Continue expectant management.  Plan to reassess in two hours.   2.  Fetal Heart Rate Tracing: category one  3.  GBS negative.  Antibiotics are not indicated.  4.  Pain Management: natural labor, changing positions      Ina Marcano MD    "

## 2019-06-16 NOTE — PLAN OF CARE
at 1255. Pt alert, active, and stable. No signs or symptoms of distress. VSS. Bleeding WNL. Will continue to monitor closely.

## 2019-06-16 NOTE — PLAN OF CARE
Data: Patient presented to [facility] at 0859.   Reason for maternal/fetal assessment per patient is Laboring  .  Patient is a . Prenatal record reviewed.      OB History    Para Term  AB Living   6 5 5 0 0 5   SAB TAB Ectopic Multiple Live Births   0 0 0 0 5      # Outcome Date GA Lbr Darshan/2nd Weight Sex Delivery Anes PTL Lv   6 Current            5 Term 11 40w1d 05:20 / 00:05 3.6 kg (7 lb 15 oz) F Vag-Spont   JACKIE      Name: RAJ BHAT,GIRL BABY      Apgar1: 9  Apgar5: 9   4 Term 2003 40w0d   M Vag-Spont  N JACKIE   3 Term 2001 40w0d   F Vag-Spont  N JACKIE   2 Term 2000 40w0d   F Vag-Spont  N JACKIE   1 Term 1999 40w0d   M Vag-Spont  N JACKIE   . Medical history:   Past Medical History:   Diagnosis Date     PPD positive     cxr negative   . Gestational Age 40w1d. VSS. Fetal movement present. Patient denies vaginal discharge, pelvic pressure, UTI symptoms, GI problems, vaginal bleeding, edema, headache, visual disturbances, epigastric or URQ pain, abdominal pain, rupture of membranes. Support persons are present.  Action: Verbal consent for EFM. Triage assessment completed. EFM applied for FHR. Uterine assessment by TOCO. Fetal assessment: Presumed adequate fetal oxygenation documented (see flow record).   Response: Dr. Marcano informed of arrival. Plan per provider is admit pt. Patient verbalized agreement with plan. Patient transferred to room 442 ambulatory, oriented to room and call light.

## 2019-06-16 NOTE — H&P
"                                                   [facility]  Ob Admit /Triage Note    Maureen Finnegan MRN# [id number]   Age: 38 year old YOB: 1980     Date of Admission: 2019 9:55 AM  Date of service: 2019.       History of Present Illness (Resident / Clinician):   Maureen Finnegan is a patient from [facility]. She is a 38 year old  who is 40w1d pregnant with MOUNA Josh 15, 2019, by last menstrual period was 2018 that is consistent with 12 wk US.    [facility].  She reports regular contractions starting on Friday night and then getting worse overnight last night, and occurring every 3-5 minutes. She denies fluid leakage. She reports bleeding per vagina. Fetal movement is normal. Her previous labors have been \"quick\" and her previous babies have all been around 6-7 lbs.    Her prenatal course has been complicated by limited PNC in the second trimester.    Prenatal labs   Lab Results   Component Value Date    ABO O 2019    RH Pos 2019    AS Neg 2019    HEPBANG non-reactive 2018    TREPAB non-reactive 2019    RUBELLAABIGG immune 2018    HGB 10.8 (L) 2019    HIV non-reactive 2019    GBS negative 2019     VZV negative  HepBsAB NEG         Obstetrical History:   She is a 38 year old   Her OB history:   OB History    Para Term  AB Living   6 5 5 0 0 5   SAB TAB Ectopic Multiple Live Births   0 0 0 0 5      # Outcome Date GA Lbr Darshan/2nd Weight Sex Delivery Anes PTL Lv   6 Current            5 Term 11 40w1d 05:20 / 00:05 3.6 kg (7 lb 15 oz) F Vag-Spont   JACKIE      Name: RAJ FINNEGAN,GIRL BABY      Apgar1: 9  Apgar5: 9   4 Term 2003 40w0d   M Vag-Spont  N JACKIE   3 Term 2001 40w0d   F Vag-Spont  N JACKIE   2 Term 2000 40w0d   F Vag-Spont  N JACKIE   1 Term 1999 40w0d   M Vag-Spont  N JACKIE            Immunzations:     Most " "Recent Immunizations   Administered Date(s) Administered     HepB-Adult 04/23/2019     TDAP Vaccine (Adacel) 04/02/2019          Past Medical History:     Past Medical History:   Diagnosis Date     PPD positive 2010    cxr negative            Past Surgical History:   History reviewed. No pertinent surgical history.         Family History:   History reviewed. No pertinent family history.         Social History:   no tobacco use  no alcohol use  no illicit drug use         Medications:       No current facility-administered medications on file prior to encounter.   Current Outpatient Medications on File Prior to Encounter:  Prenatal Vitamins (DIS) TABS Take 1 tablet by mouth daily.   docusate sodium (COLACE) 100 MG capsule Take 1 capsule by mouth 2 times daily.            Allergies:   Patient has no known allergies.         Review of Systems:   CONSTITUTIONAL: no fatigue, no unexpected change in weight  SKIN: no worrisome rashes or lesions  EYES: no acute vision problems or changes  ENT: no ear problems, no mouth problems, no throat problems  RESP: no significant cough, no shortness of breath  CV: no chest pain, no palpitations, no new or worsening peripheral edema  GI: no nausea, no vomiting, no constipation, no diarrhea  : no frequency, no dysuria, no hematuria - some VB as per HPI  NEURO: no weakness, no dizziness, no headaches  ENDOCRINE: no temperature intolerance, no skin/hair changes  PSYCHIATRIC: NEGATIVE for changes in mood or trouble with sleep         Physical Exam:   Vitals:   Ht 1.651 m (5' 5\")   LMP 09/08/2018   BMI 21.97 kg/m    0 lbs 0 oz  Estimated body mass index is 21.97 kg/m  as calculated from the following:    Height as of this encounter: 1.651 m (5' 5\").    Weight as of 10/2/13: 59.9 kg (132 lb).    GEN: Awake, alert in no apparent distress   HEENT: grossly normal  NECK: no lymphadenopathy or thyromegaly  RESPIRATORY: clear to auscultation bilaterally, no increased work of breathing  BACK:  " no costovertebral angle tenderness   CARDIOVASCULAR: RRR, no murmur  ABDOMEN: gravid, VTX by Leopold's   Cervix: 5/80/0 (on presentation was 3cm per RN)  EXT:  no edema or calf tenderness  EFW on Exam: 3300g  Confirmed VTX by Ultrasound? YES    Fetal Monitoring  External Monitor, FHT: Baseline 140 bpm. Variability is  moderate (5-25 bpm),  Accelerations are Present, decelerations are Absent ., TOCO: Contractions every 4-5 minutes and palpate moderate., Tracing is Category 1.       Assessment and Plan:   Assessment:   Maureen Finnegan is a 38 year old  at 40w1d in active labor.   Patient Active Problem List   Diagnosis     Labor and delivery, indication for care     Abdominal pain affecting pregnancy         Plan:   - Admit - see IP orders  Anticipate   OB MD consultant on call Dr. Pleitez/ available prn  - Pain: prefers natural labor, but did discuss all options available if needed  Patient is grandmultip  - baseline Hgb, ABO+Rh testing completed  - risk for PPH  GBS negative    # Pain Assessment:  Current Pain Score 5/15/2019   Patient currently in pain? yes   Pain score (0-10) -   Pain location -   Pain descriptors Aching   - Maureen is experiencing pain due to active labor. Pain management was discussed and the plan was created in a collaborative fashion.  Maureen's response to the current recommendations: engaged  - Non-pharmacologic adjuvants: position changes, heat as needed      Ina Marcano MD

## 2019-06-17 VITALS
SYSTOLIC BLOOD PRESSURE: 80 MMHG | TEMPERATURE: 98.2 F | RESPIRATION RATE: 18 BRPM | BODY MASS INDEX: 23.38 KG/M2 | HEIGHT: 63 IN | DIASTOLIC BLOOD PRESSURE: 54 MMHG

## 2019-06-17 PROBLEM — R10.9 ABDOMINAL PAIN AFFECTING PREGNANCY: Status: RESOLVED | Noted: 2019-05-15 | Resolved: 2019-06-17

## 2019-06-17 PROBLEM — O26.899 ABDOMINAL PAIN AFFECTING PREGNANCY: Status: RESOLVED | Noted: 2019-05-15 | Resolved: 2019-06-17

## 2019-06-17 LAB — HGB BLD-MCNC: 12.2 G/DL (ref 11.7–15.7)

## 2019-06-17 PROCEDURE — 85018 HEMOGLOBIN: CPT | Performed by: FAMILY MEDICINE

## 2019-06-17 PROCEDURE — 36415 COLL VENOUS BLD VENIPUNCTURE: CPT | Performed by: FAMILY MEDICINE

## 2019-06-17 PROCEDURE — 25000132 ZZH RX MED GY IP 250 OP 250 PS 637: Performed by: FAMILY MEDICINE

## 2019-06-17 RX ORDER — ACETAMINOPHEN AND CODEINE PHOSPHATE 120; 12 MG/5ML; MG/5ML
0.35 SOLUTION ORAL DAILY
Qty: 90 TABLET | Refills: 3 | Status: SHIPPED | OUTPATIENT
Start: 2019-06-17

## 2019-06-17 RX ORDER — IBUPROFEN 600 MG/1
600 TABLET, FILM COATED ORAL EVERY 6 HOURS PRN
Qty: 60 TABLET | Refills: 0 | Status: SHIPPED | OUTPATIENT
Start: 2019-06-17 | End: 2022-01-21

## 2019-06-17 RX ORDER — DOCUSATE SODIUM 100 MG/1
100 CAPSULE, LIQUID FILLED ORAL
Qty: 100 CAPSULE | Refills: 0 | Status: SHIPPED | OUTPATIENT
Start: 2019-06-17

## 2019-06-17 RX ADMIN — ACETAMINOPHEN 650 MG: 325 TABLET, FILM COATED ORAL at 04:40

## 2019-06-17 RX ADMIN — IBUPROFEN 800 MG: 800 TABLET, FILM COATED ORAL at 04:40

## 2019-06-17 RX ADMIN — SENNOSIDES AND DOCUSATE SODIUM 1 TABLET: 8.6; 5 TABLET ORAL at 08:50

## 2019-06-17 NOTE — DISCHARGE SUMMARY
[facility]  Post-partum Discharge Summary    Maureen Finnegan MRN# [id number]   Age: 38 year old YOB: 1980     Date of Admission:  2019  Date of Discharge::  2019  Admitting Physician:  Ina Marcano MD  Discharge Physician:  Ina Marcano MD     Home clinic: [facility]            Admission Diagnoses:   Maternity*MOUNA:06/15/19*Labor  Labor and delivery, indication for care          Discharge Diagnosis:   Normal spontaneous vaginal delivery  Intrauterine pregnancy at 40+1 weeks gestation  Patient Active Problem List   Diagnosis     Labor and delivery, indication for care     Abdominal pain affecting pregnancy     Antepartum multigravida of advanced maternal age     Insufficient prenatal care, second trimester     Not immune to hepatitis B virus     Supervision of other high risk pregnancies, unspecified trimester     Vitamin D deficiency      (normal spontaneous vaginal delivery)             Procedures:   Procedure(s): No additional procedures performed                Medications Prior to Admission:     Medications Prior to Admission   Medication Sig Dispense Refill Last Dose     Prenatal Vitamins (DIS) TABS Take 1 tablet by mouth daily. 30 tablet 1 Past Week at Unknown time     [DISCONTINUED] oxyCODONE (ROXICODONE) 5 MG tablet Take 1 tablet (5 mg) by mouth every 6 hours as needed for moderate to severe pain 12 tablet 0 Unknown at Unknown time     [DISCONTINUED] tamsulosin (FLOMAX) 0.4 MG capsule Take 1 capsule (0.4 mg) by mouth daily 30 capsule 0 Unknown at Unknown time             Discharge Medications:     Current Discharge Medication List      START taking these medications    Details   ibuprofen (ADVIL/MOTRIN) 600 MG tablet Take 1 tablet (600 mg) by mouth every 6 hours as needed for moderate pain  Qty: 60 tablet, Refills: 0    Associated Diagnoses:  (normal spontaneous vaginal delivery)       norethindrone (MICRONOR) 0.35 MG tablet Take 1 tablet (0.35 mg) by mouth daily  Qty: 90 tablet, Refills: 3    Associated Diagnoses: General counseling for prescription of oral contraceptives         CONTINUE these medications which have CHANGED    Details   docusate sodium (COLACE) 100 MG capsule Take 1 capsule (100 mg) by mouth nightly as needed for constipation  Qty: 100 capsule, Refills: 0    Associated Diagnoses:  (normal spontaneous vaginal delivery)         CONTINUE these medications which have NOT CHANGED    Details   Prenatal Vitamins (DIS) TABS Take 1 tablet by mouth daily.  Qty: 30 tablet, Refills: 1    Associated Diagnoses: Active labor         STOP taking these medications       oxyCODONE (ROXICODONE) 5 MG tablet Comments:   Reason for Stopping:         tamsulosin (FLOMAX) 0.4 MG capsule Comments:   Reason for Stopping:                     Consultations:   No consultations were requested during this admission          Brief History of Labor:   On 19 at 12:55 PM, this 38 year old year old  under None analgesia delivered a viable Female infant weighing 7lb 11.5oz with APGAR scores below:  APGARs 1 Min 5Min 10Min   Totals:  9 9              Hospital Course (HPI):   The patient's hospital course was unremarkable.  On discharge, her pain was well controlled. Vaginal bleeding is decreased.  Voiding without difficulty.  Ambulating well and tolerating a normal diet.  No fever. Breast feeding going well.  Infant is stable.  She was discharged on post-partum day #1. Contraception plan: progestin-only pills. Post partum depression education was provided.         D/C Physical Exam:     Vitals:    19 1519 19 1920 19 2315 19 0839   BP: 96/59 103/64 (!) 81/53 (!) 80/54   Resp: 18 18 16 18   Temp: 98.5  F (36.9  C) 98.3  F (36.8  C) 97.8  F (36.6  C) 98.2  F (36.8  C)   TempSrc: Oral Oral Oral Oral   Height:           GENERAL:         healthy, alert and no  distress  RESPIRATORY:   No increased work of breathing, good air exchange, clear to auscultation bilaterally, no crackles or wheezing   CARDIOVASCULAR:   Regular rate and rhythm, normal S1 and S2, no S3 or S4, and no murmur noted   ABDOMEN:   Normal bowel sounds, soft, non-distended, non-tender, no masses palpated  Fundal height at level of umbilicus -1 cm.  Firm and not tender.   EXTREMITIES:          No edema, non-tender    Post-partum hemoglobin:   Hemoglobin   Date Value Ref Range Status   06/17/2019 12.2 11.7 - 15.7 g/dL Final           Discharge Instructions and Follow-Up:   Discharge diet: Regular   Discharge activity: Activity as tolerated   Discharge follow-up: Follow up with primary care provider in 6 weeks   Wound care: Not applicable     # Discharge Pain Plan:   - During her hospitalization, Maureen experienced pain due to labor and delivery.  The pain plan for discharge was discussed with Maureen and the plan was created in a collaborative fashion.    - Pharmacologic adjuvants:  NSAIDs and Acetaminophen  - Non-pharmacologic adjuvants: Heat and hydrotherapy           Discharge Disposition:   Discharged to home          Ina Marcano MD

## 2019-06-17 NOTE — PLAN OF CARE
VSS and postpartum assessments WDL.  Up ad fabiola with steady gait.  Independent with cares.  Voiding without difficulty.  Bonding well with infant.  Breastfeeding on cue with some assist for getting a deeper latch.  Also provided education and assistance with hand expression and fingerfeeding expressed colostrum to infant for hypoglycemia management.  Pain managed with ibuprofen per MAR.  PIV saline locked.  , Nuno and family present and supportive.  Will continue with postpartum cares and education per plan of care.

## 2019-06-17 NOTE — PLAN OF CARE
VSS. Cramping uterine pain controlled with PRN Tylenol and Ibuprofen. Pt declined pain medications this shift. Pt breastfeeding infant on cue, good latch noted. Tolerating regular diet. Voiding without difficulty. Passing flatus. Postpartum checks WNL. Pt educated on breast pump. Pt and spouse excited to discharge to home. Discharge instructions (including follow up appointments, signs and symptoms to monitor for and who to call if symptoms arise) reviewed with patient and spouse with help of a . Pt and spouse verbalized understanding. No questions asked. Baby bands verified. Pt discharged to home, accompanied by spouse and two daughters.

## 2019-06-17 NOTE — DISCHARGE INSTRUCTIONS
Vaginal Delivery Discharge Instructions: [place]  Actividad:     Pida a los miembros de nation gricel y amigos que la ayuden cuando lo necesite.    No ponga nada en nation vagina hasta que nation médico lo permita.    Tómese las próximas semanas con calma para que nation cuerpo tenga tiempo de recuperarse. En mir momento puede hacer cualquier actividad que sienta que puede.    No conduzca si está tomando píldoras para el dolor recetadas por nation médico. Puede conducir si está tomando píldoras de venta nagi para el dolor.    Llame a nation proveedor de atención médica si tiene alguno de estos síntomas:    Empapa betty toalla femenina con kevon en el correr de 1 hora o ve coágulos más grandes que betty pelota de golf.    Sangrado que dura más de 6 semanas.    Tiene betty secreción vaginal que huele mal.     Fiebre de 100.4  F (38  C) o más (temperatura tomada bajo nation lengua) con o sin escalofríos     Dolor, calambres o sensibilidad graves en la región inferior de nation vientre.    Aumento del dolor, hinchazón, enrojecimiento o líquido alrededor de odette puntos.    Necesidad más frecuente o urgente de orinar (hacer pis), o ardor al hacerlo.    Enrojecimiento, hinchazón o dolor alrededor de betty vena en nation pierna.    Problemas para amamantar o un área enrojecida o dolorosa en nation pecho.    Dolor que aumenta o no se va de betty episiotomía o desgarro en el perineo.    Náuseas y vómitos    Dolor en el pecho y tos o dificultad para respirar.    Problemas para manejar la tristeza, ansiedad o depresión.     Si le preocupa hacerse daño o hacerle daño al bebé, llame al médico de inmediato.     Tiene preguntas o inquietudes después de regresar a casa.    Mantenga odette zac limpias:  Lávese siempre las zac antes de tocar el área de nation perineo y los puntos.  [place] ayuda a reducir nation riesgo de infección.  Si odette zac no están sucias, puede usar un gel de alcohol para limpiarse las zac. Mantenga odette uñas cortas y limpias.      Vaginal Delivery Discharge  Instructions  Activity:     Ask family and friends for help when you need it.    Do not place anything in your vagina until your doctor approves.    Take it easy for the next few weeks to allow your body to recover. You may do any activities you feel up to at that point.    Do not drive while taking pain pills prescribed by your doctor. You may drive if taking over-the-counter pain pills.    Call your health care provider if you have any of these symptoms:    You soak a sanitary pad with blood within 1 hour, or you see blood clots larger than a golf ball.    Bleeding that lasts more than 6 weeks.    You have vaginal discharge that smells bad.     A fever of 100.4  F (38  C) or higher (temperature taken under your tongue), with or without chills     Severe, pain, cramping or tenderness in your lower belly area.    Increased pain, swelling, redness or fluid around your stitches.    A more frequent or urgent need to urinate (pee), or it burns when you pee.    Redness, swelling or pain around a vein in your leg.    Problems breastfeeding, or a red or painful area on your breast.    Pain that increases or does not go away from an episiotomy or perineal tear.    Nausea and vomiting.    Chest pain and cough or are gasping for air.    Problems coping with sadness, anxiety, or depression.     If you have any concerns about hurting yourself or the baby, call your doctor right away.     You have questions or concerns after you return home.    Keep your hands clean:  Always wash your hands before touching your perineal area and stitches.  This helps reduce your risk of infection.  If your hands aren t dirty, you may use an alcohol hand-rub to clean your hands. Keep your nails clean and short.

## 2019-06-17 NOTE — PLAN OF CARE
VSS and postpartum assessment WDL. Pain managed with tylenol, ibuprofen, and hot packs applied to abdomen. Voiding without issue. Breastfeeding independently. Bonding well with . Continue with plan of care.

## 2022-01-21 ENCOUNTER — APPOINTMENT (OUTPATIENT)
Dept: CT IMAGING | Facility: CLINIC | Age: 42
End: 2022-01-21
Attending: EMERGENCY MEDICINE

## 2022-01-21 ENCOUNTER — HOSPITAL ENCOUNTER (EMERGENCY)
Facility: CLINIC | Age: 42
Discharge: HOME | End: 2022-01-21
Attending: EMERGENCY MEDICINE | Admitting: EMERGENCY MEDICINE

## 2022-01-21 VITALS
OXYGEN SATURATION: 97 % | DIASTOLIC BLOOD PRESSURE: 65 MMHG | TEMPERATURE: 98.2 F | RESPIRATION RATE: 16 BRPM | HEART RATE: 59 BPM | SYSTOLIC BLOOD PRESSURE: 104 MMHG | WEIGHT: 151.6 LBS | BODY MASS INDEX: 26.85 KG/M2

## 2022-01-21 DIAGNOSIS — N20.0 NEPHROLITHIASIS: ICD-10-CM

## 2022-01-21 LAB
ALBUMIN SERPL-MCNC: 4 G/DL (ref 3.4–5)
ALBUMIN UR-MCNC: NEGATIVE MG/DL
ALP SERPL-CCNC: 119 U/L (ref 40–150)
ALT SERPL W P-5'-P-CCNC: 59 U/L (ref 0–50)
ANION GAP SERPL CALCULATED.3IONS-SCNC: 5 MMOL/L (ref 3–14)
APPEARANCE UR: CLEAR
AST SERPL W P-5'-P-CCNC: 32 U/L (ref 0–45)
BASOPHILS # BLD AUTO: 0 10E3/UL (ref 0–0.2)
BASOPHILS NFR BLD AUTO: 0 %
BILIRUB SERPL-MCNC: 0.2 MG/DL (ref 0.2–1.3)
BILIRUB UR QL STRIP: NEGATIVE
BUN SERPL-MCNC: 20 MG/DL (ref 7–30)
CALCIUM SERPL-MCNC: 9.3 MG/DL (ref 8.5–10.1)
CHLORIDE BLD-SCNC: 108 MMOL/L (ref 94–109)
CK SERPL-CCNC: 124 U/L (ref 30–225)
CO2 SERPL-SCNC: 25 MMOL/L (ref 20–32)
COLOR UR AUTO: ABNORMAL
CREAT SERPL-MCNC: 0.61 MG/DL (ref 0.52–1.04)
EOSINOPHIL # BLD AUTO: 0.2 10E3/UL (ref 0–0.7)
EOSINOPHIL NFR BLD AUTO: 2 %
ERYTHROCYTE [DISTWIDTH] IN BLOOD BY AUTOMATED COUNT: 13.1 % (ref 10–15)
GFR SERPL CREATININE-BSD FRML MDRD: >90 ML/MIN/1.73M2
GLUCOSE BLD-MCNC: 98 MG/DL (ref 70–99)
GLUCOSE UR STRIP-MCNC: NEGATIVE MG/DL
HCG UR QL: NEGATIVE
HCT VFR BLD AUTO: 40.8 % (ref 35–47)
HGB BLD-MCNC: 13.5 G/DL (ref 11.7–15.7)
HGB UR QL STRIP: ABNORMAL
IMM GRANULOCYTES # BLD: 0 10E3/UL
IMM GRANULOCYTES NFR BLD: 0 %
KETONES UR STRIP-MCNC: NEGATIVE MG/DL
LEUKOCYTE ESTERASE UR QL STRIP: NEGATIVE
LIPASE SERPL-CCNC: 106 U/L (ref 73–393)
LYMPHOCYTES # BLD AUTO: 2.4 10E3/UL (ref 0.8–5.3)
LYMPHOCYTES NFR BLD AUTO: 34 %
MCH RBC QN AUTO: 30.5 PG (ref 26.5–33)
MCHC RBC AUTO-ENTMCNC: 33.1 G/DL (ref 31.5–36.5)
MCV RBC AUTO: 92 FL (ref 78–100)
MONOCYTES # BLD AUTO: 0.5 10E3/UL (ref 0–1.3)
MONOCYTES NFR BLD AUTO: 7 %
MUCOUS THREADS #/AREA URNS LPF: PRESENT /LPF
NEUTROPHILS # BLD AUTO: 3.9 10E3/UL (ref 1.6–8.3)
NEUTROPHILS NFR BLD AUTO: 57 %
NITRATE UR QL: NEGATIVE
NRBC # BLD AUTO: 0 10E3/UL
NRBC BLD AUTO-RTO: 0 /100
PH UR STRIP: 6.5 [PH] (ref 5–7)
PLATELET # BLD AUTO: 203 10E3/UL (ref 150–450)
POTASSIUM BLD-SCNC: 3.7 MMOL/L (ref 3.4–5.3)
PROT SERPL-MCNC: 8.1 G/DL (ref 6.8–8.8)
RBC # BLD AUTO: 4.42 10E6/UL (ref 3.8–5.2)
RBC URINE: <1 /HPF
SODIUM SERPL-SCNC: 138 MMOL/L (ref 133–144)
SP GR UR STRIP: 1.02 (ref 1–1.03)
SQUAMOUS EPITHELIAL: <1 /HPF
UROBILINOGEN UR STRIP-MCNC: NORMAL MG/DL
WBC # BLD AUTO: 7.1 10E3/UL (ref 4–11)
WBC URINE: 1 /HPF

## 2022-01-21 PROCEDURE — 99284 EMERGENCY DEPT VISIT MOD MDM: CPT | Mod: 25 | Performed by: EMERGENCY MEDICINE

## 2022-01-21 PROCEDURE — 81001 URINALYSIS AUTO W/SCOPE: CPT | Performed by: FAMILY MEDICINE

## 2022-01-21 PROCEDURE — 83690 ASSAY OF LIPASE: CPT | Performed by: EMERGENCY MEDICINE

## 2022-01-21 PROCEDURE — 82550 ASSAY OF CK (CPK): CPT | Performed by: EMERGENCY MEDICINE

## 2022-01-21 PROCEDURE — 74176 CT ABD & PELVIS W/O CONTRAST: CPT

## 2022-01-21 PROCEDURE — 258N000003 HC RX IP 258 OP 636: Performed by: EMERGENCY MEDICINE

## 2022-01-21 PROCEDURE — 96374 THER/PROPH/DIAG INJ IV PUSH: CPT | Performed by: EMERGENCY MEDICINE

## 2022-01-21 PROCEDURE — 96361 HYDRATE IV INFUSION ADD-ON: CPT | Performed by: EMERGENCY MEDICINE

## 2022-01-21 PROCEDURE — 85025 COMPLETE CBC W/AUTO DIFF WBC: CPT | Performed by: EMERGENCY MEDICINE

## 2022-01-21 PROCEDURE — 250N000011 HC RX IP 250 OP 636: Performed by: EMERGENCY MEDICINE

## 2022-01-21 PROCEDURE — 80053 COMPREHEN METABOLIC PANEL: CPT | Performed by: EMERGENCY MEDICINE

## 2022-01-21 PROCEDURE — 82040 ASSAY OF SERUM ALBUMIN: CPT | Performed by: EMERGENCY MEDICINE

## 2022-01-21 PROCEDURE — 81025 URINE PREGNANCY TEST: CPT | Performed by: FAMILY MEDICINE

## 2022-01-21 PROCEDURE — 36415 COLL VENOUS BLD VENIPUNCTURE: CPT | Performed by: EMERGENCY MEDICINE

## 2022-01-21 PROCEDURE — 99284 EMERGENCY DEPT VISIT MOD MDM: CPT | Mod: GC | Performed by: EMERGENCY MEDICINE

## 2022-01-21 PROCEDURE — 81025 URINE PREGNANCY TEST: CPT | Performed by: EMERGENCY MEDICINE

## 2022-01-21 PROCEDURE — 81001 URINALYSIS AUTO W/SCOPE: CPT | Performed by: EMERGENCY MEDICINE

## 2022-01-21 PROCEDURE — 250N000013 HC RX MED GY IP 250 OP 250 PS 637: Performed by: PSYCHIATRY & NEUROLOGY

## 2022-01-21 RX ORDER — ACETAMINOPHEN 500 MG
1000 TABLET ORAL 3 TIMES DAILY
Qty: 100 TABLET | Refills: 0 | Status: SHIPPED | OUTPATIENT
Start: 2022-01-21

## 2022-01-21 RX ORDER — KETOROLAC TROMETHAMINE 30 MG/ML
30 INJECTION, SOLUTION INTRAMUSCULAR; INTRAVENOUS ONCE
Status: COMPLETED | OUTPATIENT
Start: 2022-01-21 | End: 2022-01-21

## 2022-01-21 RX ORDER — SODIUM CHLORIDE 9 MG/ML
INJECTION, SOLUTION INTRAVENOUS CONTINUOUS
Status: DISCONTINUED | OUTPATIENT
Start: 2022-01-21 | End: 2022-01-21 | Stop reason: HOSPADM

## 2022-01-21 RX ORDER — IBUPROFEN 600 MG/1
600 TABLET, FILM COATED ORAL EVERY 6 HOURS PRN
Qty: 60 TABLET | Refills: 0 | Status: SHIPPED | OUTPATIENT
Start: 2022-01-21

## 2022-01-21 RX ORDER — IBUPROFEN 600 MG/1
600 TABLET, FILM COATED ORAL EVERY 6 HOURS PRN
Qty: 100 TABLET | Refills: 0 | Status: SHIPPED | OUTPATIENT
Start: 2022-01-21

## 2022-01-21 RX ORDER — OXYCODONE HYDROCHLORIDE 5 MG/1
5 TABLET ORAL ONCE
Status: COMPLETED | OUTPATIENT
Start: 2022-01-21 | End: 2022-01-21

## 2022-01-21 RX ADMIN — SODIUM CHLORIDE: 9 INJECTION, SOLUTION INTRAVENOUS at 18:41

## 2022-01-21 RX ADMIN — KETOROLAC TROMETHAMINE 30 MG: 30 INJECTION, SOLUTION INTRAMUSCULAR at 17:46

## 2022-01-21 RX ADMIN — OXYCODONE HYDROCHLORIDE 5 MG: 5 TABLET ORAL at 18:41

## 2022-01-21 RX ADMIN — SODIUM CHLORIDE 1000 ML: 9 INJECTION, SOLUTION INTRAVENOUS at 17:46

## 2022-01-21 NOTE — ED TRIAGE NOTES
Pt states she has pain in the left flank pain, pain has been present for the past two weeks but pain has increased the past two days.  Pt has taken OTC tylenol yesterday with little relief. Denies any other pain, denies any other UTI symptoms other than left side flank pain

## 2022-01-21 NOTE — ED PROVIDER NOTES
[facility] [place]    22       History     Chief Complaint   Patient presents with     Flank Pain     HPI  Maureen Lebron is a  41 year old woman with prior history of nephrolithiasis who presents with flank pain. A  was used. She reports pain on her left side starting about 2 weeks ago. It has worsened yesterday and today, making it difficult to sleep and lay on her side. She rates the current pain as 8/10. Tylenol has not been effective at controlling her pain. Maureen does not endorse dysuria, pain with bowel movements, or noted hematuria. She is not currently on birth control but is having regular periods. Something similar to this happened 2.5 years ago while she was pregnant, and it resolved itself with a medications (tamsulosin per chart review).     Past Medical History  Past Medical History:   Diagnosis Date     PPD positive     cxr negative       History reviewed. No pertinent surgical history.  acetaminophen (TYLENOL) 500 MG tablet  docusate sodium (COLACE) 100 MG capsule  ibuprofen (ADVIL/MOTRIN) 600 MG tablet  ibuprofen (ADVIL/MOTRIN) 600 MG tablet  norethindrone (MICRONOR) 0.35 MG tablet  Prenatal Vitamins (DIS) TABS      No Known Allergies  Family History  Family History   Problem Relation Age of Onset     Cancer No family hx of      Social History   Social History     Tobacco Use     Smoking status: Never Smoker     Smokeless tobacco: Never Used   Substance Use Topics     Alcohol use: No     Drug use: No      Lives in [place] with her  and children. She works at [employer].     Past medical history, past surgical history, medications, allergies, family history, and social history were reviewed with the patient. No additional pertinent items.       Review of Systems  A complete review of systems was performed with pertinent positives and negatives noted in the HPI, and all other systems negative.   Denies fevers, chills,  pain elsewhere, headaches, back pain, etc.    Physical Exam   BP: 97/58  Pulse: 70  Temp: 97.9  F (36.6  C)  Resp: 12  Weight: 68.8 kg (151 lb 9.6 oz)  SpO2: 97 %  Physical Exam  Constitutional:       General: She is awake.      Appearance: She is obese.      Comments: Appears uncomfortable, bent over gurney, holding side   HENT:      Head: Normocephalic and atraumatic.      Mouth/Throat:      Mouth: Mucous membranes are moist.   Eyes:      General:         Right eye: No discharge.         Left eye: No discharge.      Extraocular Movements: Extraocular movements intact.   Cardiovascular:      Rate and Rhythm: Normal rate.      Pulses: Normal pulses.      Heart sounds: Normal heart sounds.   Pulmonary:      Breath sounds: Normal breath sounds.   Abdominal:      General: Bowel sounds are normal.      Tenderness: There is abdominal tenderness in the left lower quadrant. There is no right CVA tenderness or left CVA tenderness. Negative signs include Garcia's sign and psoas sign.      Hernia: No hernia is present.      Comments: Tender to palpation of the LLQ more lateral, almost decubitus. The pain radiates down toward her pubis.   Musculoskeletal:      Cervical back: Normal range of motion.   Skin:     General: Skin is warm.      Findings: No rash.   Neurological:      General: No focal deficit present.      Mental Status: She is alert and oriented to person, place, and time.   Psychiatric:         Mood and Affect: Mood normal.         Behavior: Behavior normal. Behavior is cooperative.         Thought Content: Thought content normal.           ED Course       5:09 PM  The patient was seen and examined by Kannan Watts MD in Room 06. A UA and UPT were obtained, which showed no pregnancy but moderate blood in her urine. CBC, CMP, CK, and lipase were also ordered, but were unremarkable. She was given 1 L of NS and ketorolac for her pain. Maureen was given another dose of oxycodone because her pain wasn't controlled. CT  abd/pelvis w/out contrast demonstrated no hydronephrosis or renal calculus in the left kidney, but demonstrated a stone settled in her right kidney. Upon returning from the CT, her pain had resolved. Maureen was ok to return home with medications for pain.       Results for orders placed or performed during the hospital encounter of 01/21/22   CT Abdomen Pelvis w/o Contrast     Status: None    Narrative    EXAM: CT ABDOMEN PELVIS W/O CONTRAST  LOCATION: [facility]  DATE/TIME: 1/21/2022 6:51 PM    INDICATION: Flank pain, kidney stone suspected  COMPARISON: Renal ultrasound on 05/15/2019  TECHNIQUE: CT scan of the abdomen and pelvis was performed without IV contrast. Multiplanar reformats were obtained. Dose reduction techniques were used.  CONTRAST: None.    FINDINGS:   LOWER CHEST: Bibasilar pulmonary opacities, likely atelectasis.    HEPATOBILIARY: The unenhanced liver and gallbladder are grossly unremarkable.    PANCREAS: The unenhanced pancreas is grossly unremarkable.    SPLEEN: No splenomegaly.    ADRENAL GLANDS: No adrenal nodules.    KIDNEYS/BLADDER: 5 mm stone at the lower pole of the right kidney (series 3 image 228). No left kidney stone. No ureteral stone or hydronephrosis in either kidney.    BOWEL: No abnormally dilated bowel loops. The appendix is visualized and appears normal.    PERITONEUM: No significant free fluid in the abdomen or pelvis. No free peritoneal or portal venous gas.    LYMPH NODES: No significant free fluid in the abdomen or pelvis. No free peritoneal or portal venous gas.    VASCULATURE: Unremarkable    PELVIC ORGANS: Unremarkable.    MUSCULOSKELETAL: Multilevel degenerative changes of the spine. No suspicious osseous lesion. Small fat-containing umbilical hernia.      Impression    IMPRESSION:   1.  5 mm stone at the lower pole of the right kidney. No left kidney stone. No ureteral stone or hydronephrosis in either kidney.     UA with  Microscopic reflex to Culture     Status: Abnormal    Specimen: Urine, Clean Catch   Result Value Ref Range    Color Urine Light Yellow Colorless, Straw, Light Yellow, Yellow    Appearance Urine Clear Clear    Glucose Urine Negative Negative mg/dL    Bilirubin Urine Negative Negative    Ketones Urine Negative Negative mg/dL    Specific Gravity Urine 1.025 1.003 - 1.035    Blood Urine Moderate (A) Negative    pH Urine 6.5 5.0 - 7.0    Protein Albumin Urine Negative Negative mg/dL    Urobilinogen Urine Normal Normal, 2.0 mg/dL    Nitrite Urine Negative Negative    Leukocyte Esterase Urine Negative Negative    Mucus Urine Present (A) None Seen /LPF    RBC Urine <1 <=2 /HPF    WBC Urine 1 <=5 /HPF    Squamous Epithelials Urine <1 <=1 /HPF    Narrative    Urine Culture not indicated   HCG qualitative urine     Status: Normal   Result Value Ref Range    hCG Urine Qualitative Negative Negative   Comprehensive metabolic panel     Status: Abnormal   Result Value Ref Range    Sodium 138 133 - 144 mmol/L    Potassium 3.7 3.4 - 5.3 mmol/L    Chloride 108 94 - 109 mmol/L    Carbon Dioxide (CO2) 25 20 - 32 mmol/L    Anion Gap 5 3 - 14 mmol/L    Urea Nitrogen 20 7 - 30 mg/dL    Creatinine 0.61 0.52 - 1.04 mg/dL    Calcium 9.3 8.5 - 10.1 mg/dL    Glucose 98 70 - 99 mg/dL    Alkaline Phosphatase 119 40 - 150 U/L    AST 32 0 - 45 U/L    ALT 59 (H) 0 - 50 U/L    Protein Total 8.1 6.8 - 8.8 g/dL    Albumin 4.0 3.4 - 5.0 g/dL    Bilirubin Total 0.2 0.2 - 1.3 mg/dL    GFR Estimate >90 >60 mL/min/1.73m2   Lipase     Status: Normal   Result Value Ref Range    Lipase 106 73 - 393 U/L   CK total     Status: Normal   Result Value Ref Range     30 - 225 U/L   CBC with platelets and differential     Status: None   Result Value Ref Range    WBC Count 7.1 4.0 - 11.0 10e3/uL    RBC Count 4.42 3.80 - 5.20 10e6/uL    Hemoglobin 13.5 11.7 - 15.7 g/dL    Hematocrit 40.8 35.0 - 47.0 %    MCV 92 78 - 100 fL    MCH 30.5 26.5 - 33.0 pg    MCHC 33.1  31.5 - 36.5 g/dL    RDW 13.1 10.0 - 15.0 %    Platelet Count 203 150 - 450 10e3/uL    % Neutrophils 57 %    % Lymphocytes 34 %    % Monocytes 7 %    % Eosinophils 2 %    % Basophils 0 %    % Immature Granulocytes 0 %    NRBCs per 100 WBC 0 <1 /100    Absolute Neutrophils 3.9 1.6 - 8.3 10e3/uL    Absolute Lymphocytes 2.4 0.8 - 5.3 10e3/uL    Absolute Monocytes 0.5 0.0 - 1.3 10e3/uL    Absolute Eosinophils 0.2 0.0 - 0.7 10e3/uL    Absolute Basophils 0.0 0.0 - 0.2 10e3/uL    Absolute Immature Granulocytes 0.0 <=0.4 10e3/uL    Absolute NRBCs 0.0 10e3/uL   CBC with platelets differential     Status: None    Narrative    The following orders were created for panel order CBC with platelets differential.  Procedure                               Abnormality         Status                     ---------                               -----------         ------                     CBC with platelets and d...[[id number]]                      Final result                 Please view results for these tests on the individual orders.     Medications   0.9% sodium chloride BOLUS (0 mLs Intravenous Stopped 22)   ketorolac (TORADOL) injection 30 mg (30 mg Intravenous Given 22)   oxyCODONE (ROXICODONE) tablet 5 mg (5 mg Oral Given 22)        Assessments & Plan (with Medical Decision Making)   Maureen Lebron is a  Georgian-speaking 41 year old woman with prior history of nephrolithiasis who presents with L flank pain, found to have microscopic hematuria. Differential includes nephrolithiasis, hydronephrosis, or pyelonephritis. Pyelonephritis is less likely due to no signs of infection of on her UA. CT showed a stone in her right kidney but nothing in her left kidney. She likely had a kidney stone, which already passed. Her R kidney stone does not need any intervention at this time. She can follow-up with her PCP to discuss ways to decrease recurrence of renal calculi.    - Use ibuprofen and tylenol  to control pain  - F/U with PCP     I have reviewed the nursing notes. I have reviewed the findings, diagnosis, plan and need for follow up with the patient.    --    ED Attending Physician Attestation    I Rick Light MD, cared for this patient with the Resident. I have performed a history and physical examination of the patient and discussed management with the resident. I reviewed the resident's documentation above and agree with the documented findings and plan of care.    Summary of HPI, PE, ED Course   Patient is a 41 year old female evaluated in the emergency department for left flank pain. Exam notable for no significant abdominal pain on palpation.  No palpable masses.  No significant CVA tenderness to percussion.  Lungs clear bilaterally. ED course notable for no evidence of pyelonephritis on urinalysis.  No significant leukocytosis.  No acute hepatobiliary process or evidence to suggest pancreatitis.  CT scan demonstrated no acute retroperitoneal, lower pulmonary, subphrenic process no evidence of hydronephrosis or ureteral stone.  Kidney stone noted on right.  Patient improved after pain medication. After the completion of care in the emergency department, the patient was discharged.    Critical Care & Procedures  Not applicable.        Rick Light MD  Emergency Medicine       Discharge Medication List as of 1/21/2022  8:48 PM      START taking these medications    Details   acetaminophen (TYLENOL) 500 MG tablet Take 2 tablets (1,000 mg) by mouth 3 times daily, Disp-100 tablet, R-0, E-Prescribe             Final diagnoses:   Nephrolithiasis       Kannan Watts MD  Psychiatry Resident Physician, PGY-1  [facility]    Jose Cruz  [facility] EMERGENCY DEPARTMENT  1/21/2022     Rick Light MD  01/22/22 [id number]

## 2022-01-21 NOTE — ED NOTES
Bed: ED06  Expected date: 1/21/22  Expected time: 4:53 PM  Means of arrival:   Comments:  Pt still in room-rm dirty

## 2022-01-22 NOTE — ED NOTES
Patient was given discharge instructions and had no questions or concerns. Understands to  prescriptions. Vitals stable.

## 2022-03-05 PROCEDURE — 99285 EMERGENCY DEPT VISIT HI MDM: CPT | Performed by: EMERGENCY MEDICINE

## 2022-03-12 ENCOUNTER — HOSPITAL ENCOUNTER (EMERGENCY)
Facility: CLINIC | Age: 42
Discharge: HOME | End: 2022-03-13
Attending: EMERGENCY MEDICINE | Admitting: EMERGENCY MEDICINE

## 2022-03-12 DIAGNOSIS — N61.1 BREAST ABSCESS: ICD-10-CM

## 2022-03-12 DIAGNOSIS — Z20.822 COVID-19 RULED OUT BY LABORATORY TESTING: ICD-10-CM

## 2022-03-12 PROCEDURE — 99285 EMERGENCY DEPT VISIT HI MDM: CPT | Mod: 25 | Performed by: EMERGENCY MEDICINE

## 2022-03-12 RX ORDER — KETOROLAC TROMETHAMINE 15 MG/ML
15 INJECTION, SOLUTION INTRAMUSCULAR; INTRAVENOUS ONCE
Status: COMPLETED | OUTPATIENT
Start: 2022-03-13 | End: 2022-03-13

## 2022-03-12 RX ORDER — ACETAMINOPHEN 500 MG
1000 TABLET ORAL ONCE
Status: COMPLETED | OUTPATIENT
Start: 2022-03-13 | End: 2022-03-13

## 2022-03-13 VITALS
HEART RATE: 66 BPM | BODY MASS INDEX: 26.39 KG/M2 | OXYGEN SATURATION: 97 % | DIASTOLIC BLOOD PRESSURE: 65 MMHG | SYSTOLIC BLOOD PRESSURE: 103 MMHG | TEMPERATURE: 98.7 F | RESPIRATION RATE: 16 BRPM | WEIGHT: 149 LBS

## 2022-03-13 LAB
BASOPHILS # BLD AUTO: 0 10E3/UL (ref 0–0.2)
BASOPHILS NFR BLD AUTO: 0 %
EOSINOPHIL # BLD AUTO: 0.2 10E3/UL (ref 0–0.7)
EOSINOPHIL NFR BLD AUTO: 2 %
ERYTHROCYTE [DISTWIDTH] IN BLOOD BY AUTOMATED COUNT: 13.3 % (ref 10–15)
HCT VFR BLD AUTO: 36.3 % (ref 35–47)
HGB BLD-MCNC: 12 G/DL (ref 11.7–15.7)
IMM GRANULOCYTES # BLD: 0 10E3/UL
IMM GRANULOCYTES NFR BLD: 0 %
LYMPHOCYTES # BLD AUTO: 1.8 10E3/UL (ref 0.8–5.3)
LYMPHOCYTES NFR BLD AUTO: 17 %
MCH RBC QN AUTO: 29.7 PG (ref 26.5–33)
MCHC RBC AUTO-ENTMCNC: 33.1 G/DL (ref 31.5–36.5)
MCV RBC AUTO: 90 FL (ref 78–100)
MONOCYTES # BLD AUTO: 0.9 10E3/UL (ref 0–1.3)
MONOCYTES NFR BLD AUTO: 8 %
NEUTROPHILS # BLD AUTO: 7.9 10E3/UL (ref 1.6–8.3)
NEUTROPHILS NFR BLD AUTO: 73 %
NRBC # BLD AUTO: 0 10E3/UL
NRBC BLD AUTO-RTO: 0 /100
PLATELET # BLD AUTO: 195 10E3/UL (ref 150–450)
RBC # BLD AUTO: 4.04 10E6/UL (ref 3.8–5.2)
SARS-COV-2 RNA RESP QL NAA+PROBE: NEGATIVE
WBC # BLD AUTO: 10.8 10E3/UL (ref 4–11)

## 2022-03-13 PROCEDURE — 96374 THER/PROPH/DIAG INJ IV PUSH: CPT | Performed by: EMERGENCY MEDICINE

## 2022-03-13 PROCEDURE — 250N000011 HC RX IP 250 OP 636: Performed by: EMERGENCY MEDICINE

## 2022-03-13 PROCEDURE — 96375 TX/PRO/DX INJ NEW DRUG ADDON: CPT | Performed by: EMERGENCY MEDICINE

## 2022-03-13 PROCEDURE — 250N000009 HC RX 250: Performed by: STUDENT IN AN ORGANIZED HEALTH CARE EDUCATION/TRAINING PROGRAM

## 2022-03-13 PROCEDURE — 87075 CULTR BACTERIA EXCEPT BLOOD: CPT | Performed by: STUDENT IN AN ORGANIZED HEALTH CARE EDUCATION/TRAINING PROGRAM

## 2022-03-13 PROCEDURE — 85025 COMPLETE CBC W/AUTO DIFF WBC: CPT | Performed by: EMERGENCY MEDICINE

## 2022-03-13 PROCEDURE — 87070 CULTURE OTHR SPECIMN AEROBIC: CPT | Performed by: STUDENT IN AN ORGANIZED HEALTH CARE EDUCATION/TRAINING PROGRAM

## 2022-03-13 PROCEDURE — 250N000013 HC RX MED GY IP 250 OP 250 PS 637: Performed by: EMERGENCY MEDICINE

## 2022-03-13 PROCEDURE — 250N000011 HC RX IP 250 OP 636: Performed by: STUDENT IN AN ORGANIZED HEALTH CARE EDUCATION/TRAINING PROGRAM

## 2022-03-13 PROCEDURE — C9803 HOPD COVID-19 SPEC COLLECT: HCPCS | Performed by: EMERGENCY MEDICINE

## 2022-03-13 PROCEDURE — 36415 COLL VENOUS BLD VENIPUNCTURE: CPT | Performed by: EMERGENCY MEDICINE

## 2022-03-13 PROCEDURE — U0003 INFECTIOUS AGENT DETECTION BY NUCLEIC ACID (DNA OR RNA); SEVERE ACUTE RESPIRATORY SYNDROME CORONAVIRUS 2 (SARS-COV-2) (CORONAVIRUS DISEASE [COVID-19]), AMPLIFIED PROBE TECHNIQUE, MAKING USE OF HIGH THROUGHPUT TECHNOLOGIES AS DESCRIBED BY CMS-2020-01-R: HCPCS | Performed by: EMERGENCY MEDICINE

## 2022-03-13 RX ORDER — LIDOCAINE HYDROCHLORIDE AND EPINEPHRINE 10; 10 MG/ML; UG/ML
30 INJECTION, SOLUTION INFILTRATION; PERINEURAL ONCE
Status: COMPLETED | OUTPATIENT
Start: 2022-03-13 | End: 2022-03-13

## 2022-03-13 RX ORDER — HYDROMORPHONE HYDROCHLORIDE 1 MG/ML
0.5 INJECTION, SOLUTION INTRAMUSCULAR; INTRAVENOUS; SUBCUTANEOUS ONCE
Status: COMPLETED | OUTPATIENT
Start: 2022-03-13 | End: 2022-03-13

## 2022-03-13 RX ADMIN — LIDOCAINE HYDROCHLORIDE AND EPINEPHRINE 30 ML: 10; 10 INJECTION, SOLUTION INFILTRATION; PERINEURAL at 03:21

## 2022-03-13 RX ADMIN — KETOROLAC TROMETHAMINE 15 MG: 15 INJECTION, SOLUTION INTRAMUSCULAR; INTRAVENOUS at 00:11

## 2022-03-13 RX ADMIN — ACETAMINOPHEN 1000 MG: 500 TABLET ORAL at 00:11

## 2022-03-13 RX ADMIN — HYDROMORPHONE HYDROCHLORIDE 0.5 MG: 1 INJECTION, SOLUTION INTRAMUSCULAR; INTRAVENOUS; SUBCUTANEOUS at 03:08

## 2022-03-13 NOTE — ED NOTES
Pt arrives via EMS from [place] for surgery consult due to left breast pain, concerns for Abscess. Surgery aware of pts arrival. Pt denies pain at this time, new guaze applied for drainage.

## 2022-03-13 NOTE — ED NOTES
The patient was accepted as a signout in transfer from the [place] ED, with a plan for surgical evaluation.  General surgery saw the patient, perform bedside I&D.  They packed the abscess.  They want her to continue her Keflex until gone, change packing daily, follow-up with primary care in 1 week.  She is encouraged to return with new or worsening symptoms, any other concerns.  She is agreeable to the plan.    Dictation Disclaimer: Some of this Note has been completed with voice-recognition dictation software. Although errors are generally corrected real-time, there is the potential for a rare error to be present in the completed chart.       Estee Jeffrey MD  03/13/22 [id number]

## 2022-03-13 NOTE — CONSULTS
General Surgery Consultation    Maureen Lebron MRN# [id number]   Age: 41 year old YOB: 1980     Date of Admission:  3/12/2022    Date of Consult:   3/12/22    Reason for consult: Breast abscess       Requesting service: ED; requesting provider: Dr. Jeffrey                   Assessment and Plan:   42 yo F with L breast abscess. Incision and drainage performed at bedside. See separate procedure note.    Continue keflex, complete 2 wk course already prescribed  Change packing daily  Follow up with PCP within 1 wk  Plan discussed with patient via interpretor, as well as ED staff, Dr. Jeffrey.    Discussed with chief, Dr. Devries.    Tulio Lemons MD           Chief Complaint:   L breast swelling and pain         History of Present Illness:   Patient interviewed with Indian interpretor via telephone.    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 Noticed a pimple on L breast about one week ago, pruritic, painful area enlarged after scratching. Seen in clinic yesterday, started on keflex. Presented to ED tonight after pain and swelling worsened. Denies fever, chills, nausea, vomiting. Normal VS in ED, no leukocytosis.              Past Medical History:     Past Medical History:   Diagnosis Date     PPD positive 2010    cxr negative             Past Surgical History:   No past surgical history on file.          Social History:     Social History     Tobacco Use     Smoking status: Never Smoker     Smokeless tobacco: Never Used   Substance Use Topics     Alcohol use: No             Family History:     Family History   Problem Relation Age of Onset     Cancer No family hx of                 Allergies:   No Known Allergies          Medications:     No current facility-administered medications for this encounter.     Current Outpatient Medications   Medication Sig     acetaminophen (TYLENOL) 500 MG tablet Take 2 tablets (1,000 mg) by  mouth 3 times daily     docusate sodium (COLACE) 100 MG capsule Take 1 capsule (100 mg) by mouth nightly as needed for constipation     ibuprofen (ADVIL/MOTRIN) 600 MG tablet Take 1 tablet (600 mg) by mouth every 6 hours as needed for moderate pain     ibuprofen (ADVIL/MOTRIN) 600 MG tablet Take 1 tablet (600 mg) by mouth every 6 hours as needed for moderate pain     norethindrone (MICRONOR) 0.35 MG tablet Take 1 tablet (0.35 mg) by mouth daily     Prenatal Vitamins (DIS) TABS Take 1 tablet by mouth daily.               Review of Systems:   A 12 point review of systems was completed and found to be negative unless otherwise stated in the above HPI            Physical Exam:   /56   Pulse 59   Temp 97.4  F (36.3  C) (Oral)   Resp 15   Wt 67.6 kg (149 lb)   LMP 02/16/2022 (Approximate)   SpO2 96%   Breastfeeding Yes   BMI 26.39 kg/m      No intake or output data in the 24 hours ending 03/13/22 0058    GEN: A&Ox3, no acute distress, though tearful at times  NEURO: CN II-XII grossly intact. No gross neurologic deficits  RESP: Nonlabored breathing on room air, no cough  CV: RRR by radial pulse, noncyanotic   L breast: 5 cm indurated mass with overlaying cellulitis and small area of central skin necrosis, tender, scant dried bloody drainage  ABD: soft, non-tender, nondistended. No guarding or rebound tenderness.  MSK: Moves all extremities independently. Normal active range of motion.  PSYCH: cooperative           Data:   All laboratory data reviewed    Results:  BMPNo lab results found in last 7 days.  CBC  Recent Labs   Lab 03/13/22  0008   WBC 10.8   HGB 12.0        LFTNo lab results found in last 7 days.  No results for input(s): GLC, BGM in the last 168 hours.    Imaging: None

## 2022-03-13 NOTE — ED PROVIDER NOTES
[facility] [place] ([facility])       3/12/22  History     Chief Complaint   Patient presents with     Breast Pain     Left breast pain. Has been present 3 day since her Clinic visit.     The history is provided by the patient and medical records. The history is limited by a language barrier. A  was used (ipad).     Maureen Lebron is a 41 year old female with a past medical history significant for granulomatous mastitis who presents to the Emergency Department for evaluation of breast pain. Patient reports 7 days of left breast pain due to a growing mass on the outside of the breast. She states she went to the clinic on 3/11/22 and was discharged with a cephalexin prescription but was told to come to the ED if the mass does not drain. Patient is currently nursing and last nursed yesterday, but states there is minimal output from the left breast and does not feel engorged. She has not taken any medication for the pain. She denies fever, nausea, vomiting, chills, or lumps in the armpit.       Past Medical History  Past Medical History:   Diagnosis Date     PPD positive 2010    cxr negative     No past surgical history on file.  acetaminophen (TYLENOL) 500 MG tablet  docusate sodium (COLACE) 100 MG capsule  ibuprofen (ADVIL/MOTRIN) 600 MG tablet  ibuprofen (ADVIL/MOTRIN) 600 MG tablet  norethindrone (MICRONOR) 0.35 MG tablet  Prenatal Vitamins (DIS) TABS      No Known Allergies  Family History  Family History   Problem Relation Age of Onset     Cancer No family hx of      Social History   Social History     Tobacco Use     Smoking status: Never Smoker     Smokeless tobacco: Never Used   Substance Use Topics     Alcohol use: No     Drug use: No      Past medical history, past surgical history, medications, allergies, family history, and social history were reviewed with the patient. No additional pertinent items.     I have reviewed the Medications, Allergies, Past  Medical and Surgical History, and Social History in the Epic system.    Review of Systems  A complete review of systems was performed with pertinent positives and negatives noted in the HPI, and all other systems negative.    Physical Exam   BP: 99/65  Pulse: 74  Temp: 97.4  F (36.3  C)  Resp: 15  Weight: 67.6 kg (149 lb)  SpO2: 99 %      Physical Exam  General: awake, alert, uncomfortable appearing  Head: normal cephalic  HEENT: pupils equal, conjugate gaze intact  Neck: Supple  Breast: On the lateral left breast in approximately 3 o'clock position patient has a large area of erythema.  She has an overlying skin defect with crusting evidence of recent purulent drainage.  It is fluctuant and several centimeters in all directions.  Tender to palpation. Warm.   CV: regular rate  Lungs: breathing comfortably on room air  Abd: soft, non-tender, no guarding, no peritoneal signs  EXT: lower extremities without swelling or edema  Neuro: awake, answers questions appropriately. No focal deficits noted       ED Course     At 11:34 PM the patient was seen and examined by Tam Chase MD in Room ED03.        Procedures         The medical record was reviewed and interpreted.  Labs reviewed and interpreted by me       Results for orders placed or performed during the hospital encounter of 03/12/22 (from the past 24 hour(s))   CBC with platelets differential    Narrative    The following orders were created for panel order CBC with platelets differential.  Procedure                               Abnormality         Status                     ---------                               -----------         ------                     CBC with platelets and d...[[id number]]                      Final result                 Please view results for these tests on the individual orders.   CBC with platelets and differential   Result Value Ref Range    WBC Count 10.8 4.0 - 11.0 10e3/uL    RBC Count 4.04 3.80 - 5.20 10e6/uL    Hemoglobin 12.0  11.7 - 15.7 g/dL    Hematocrit 36.3 35.0 - 47.0 %    MCV 90 78 - 100 fL    MCH 29.7 26.5 - 33.0 pg    MCHC 33.1 31.5 - 36.5 g/dL    RDW 13.3 10.0 - 15.0 %    Platelet Count 195 150 - 450 10e3/uL    % Neutrophils 73 %    % Lymphocytes 17 %    % Monocytes 8 %    % Eosinophils 2 %    % Basophils 0 %    % Immature Granulocytes 0 %    NRBCs per 100 WBC 0 <1 /100    Absolute Neutrophils 7.9 1.6 - 8.3 10e3/uL    Absolute Lymphocytes 1.8 0.8 - 5.3 10e3/uL    Absolute Monocytes 0.9 0.0 - 1.3 10e3/uL    Absolute Eosinophils 0.2 0.0 - 0.7 10e3/uL    Absolute Basophils 0.0 0.0 - 0.2 10e3/uL    Absolute Immature Granulocytes 0.0 <=0.4 10e3/uL    Absolute NRBCs 0.0 10e3/uL     Medications   acetaminophen (TYLENOL) tablet 1,000 mg (1,000 mg Oral Given 3/13/22 0011)   ketorolac (TORADOL) injection 15 mg (15 mg Intravenous Given 3/13/22 0011)             Assessments & Plan (with Medical Decision Making)   Maureen is a 41-year-old female who presents to the emergency department with left breast pain, redness, swelling and tenderness.  On exam patient has what appears to be a palpable abscess with fluid collection.  Evidence of recent spontaneous drainage but not actively draining.    On exam she appears uncomfortable but nontoxic; denies systemic symptoms.  Discussed the case with Dr. Lewis who is on-call for surgery [facility]. Plan is to transfer to the [facility] for general surgery consult and management.  Antibiotics will be deferred at this time and left to the discretion of the general surgery service.  Dr. Lewis did request CBC and Covid testing prior to transfer.  Patient was transferred was accepted by Dr. Jeffrey of the [place] emergency department who will consult general surgery upon patient's arrival.  While in the ER she got Toradol and Tylenol for pain control.    Patient has normal white count, no left shift.    This part of the medical record was transcribed by Georgina Guzmán, Medical Scribe, from a dictation done  by Tam Chase MD.     I have reviewed the nursing notes.    I have reviewed the findings, diagnosis, plan and need for follow up with the patient.    New Prescriptions    No medications on file       Final diagnoses:   Breast abscess     I, Georgina Guzmán, am serving as a trained medical scribe to document services personally performed by Tam Chase MD based on the provider's statements to me on March 12, 2022.  This document has been checked and approved by the attending provider.    I, Tam Chase MD, was physically present and have reviewed and verified the accuracy of this note documented by Georgina Guzmán medical scribe.      Tam Chase MD  3/12/2022   [facility] EMERGENCY DEPARTMENT     Tam Chase MD  03/13/22 0030

## 2022-03-13 NOTE — PROCEDURES
Procedure Note - General Surgery    NAME: Maureen Lebron,  41 year old female    [facility]  MRN:   [id number]      #: [phone]    Date of Procedure: 03/13/22      Preoperative Dx: Left breast abscess  Postoperative Dx: Same    Procedure:   Incision and drainage of left breast abscess              Anesthesia: None  Analgesia: Dilaudid 0.5 mg, 1% lidocaine with epi 20 mL    Indication:  41 year old female presents to the ED with one week of worsening pain and swelling the left breast consistent with abscess    Procedure Details:    The risks of the procedure were discussed with the patient using a telephone interpretor, including but not limited to the risk of bleeding, infection, and damage to surrounding structures.    The abscess on the left lateral breast was prepped and draped in the usual standard fashion.  The area was anesthetized with 20 cc of 1% lidocaine with epinephrine.  0.5 mg of IV Dilaudid was also given.  1/2 inch incision was made at the center of induration over the small central region of skin necrosis.  Approximately 5 cc of purulent drainage was expressed.  The abscess cavity was gently probed and irrigated with 30 cc of saline.  The cavity was then packed with iodoform packing strip.  The patient tolerated the procedure well without any immediate complications.  Please see my consult note for follow-up plan.    Tulio Lmeons MD

## 2022-03-13 NOTE — ED NOTES
Bed: ED05  Expected date:   Expected time:   Means of arrival:   Comments:  Hold [facility]

## 2022-03-13 NOTE — DISCHARGE INSTRUCTIONS
Follow up with your clinic doctor in one week.  Continue your antibiotics until they are gone.   Change your packing daily.    Return with any new or worsening symptoms or any other concerns.

## 2022-03-14 NOTE — RESULT ENCOUNTER NOTE
[facility] Emergency Dept discharge antibiotic prescribed: None (On Keflex prior to ED visit)  Incision and Drainage performed in [facility] Emergency Dept [Yes or No]: Yes  Recommendations in treatment per [facility] ED Lab Result culture protocol

## 2022-03-15 ENCOUNTER — TELEPHONE (OUTPATIENT)
Dept: EMERGENCY MEDICINE | Facility: CLINIC | Age: 42
End: 2022-03-15

## 2022-03-15 NOTE — TELEPHONE ENCOUNTER
[facility] () Emergency Department Lab result notification     Patient/parent Name  Maureen    Reason for call  Patient requesting lab result    Lab Result  NONE    Current symptoms  12:55P - with assistance of  call to patient - patient reports she was seen Saturday, abscess of left breast with packing daily advised per AVS wound is more painful now - says she is changing packing twice daily    When was packing last changed: 3/15/22 at night  Appointment 3/16 4:00PM    Denies worsening redness, swelling, fever,     Recommendations/Instructions  Reviewed AVS and wound care - advised change packing daily x 1 more prior to appointment if tolerated, she may utilize tylenol 1000 mg every 8 hours - keep area clean and dry - monitor for signs of infection - discussed if unable to tolerate due to pain or pain worsening being seen sooner is advised by contacting PCP, UC, or ED - similarly if worsening signs of infection should be seen sooner.  Can discuss wound care with provider as well at office visit appointment tomorrow.    Jordana Holden RN  [facility] [employer] [facility]  Emergency Dept Lab Result RN  Ph# [phone]

## 2022-03-16 LAB — BACTERIA ABSC ANAEROBE+AEROBE CULT: ABNORMAL

## 2022-03-16 NOTE — RESULT ENCOUNTER NOTE
Final abscess Aerobic Bacterial Culture (specimen - breast, left) report on 3/16/22  [facility] Emergency Dept discharge antibiotic prescribed: None (On Keflex prior to ED visit)  #1. Bacteria, Methicillin resistant Staphylococcus aureus (MRSA),  is [RESISTANT] to antibiotic.  Incision and Drainage performed in the [place] ED: yes  Recommendations in treatment per [facility] ED lab result culture protocol

## 2022-03-17 ENCOUNTER — TELEPHONE (OUTPATIENT)
Dept: EMERGENCY MEDICINE | Facility: CLINIC | Age: 42
End: 2022-03-17

## 2022-03-17 NOTE — TELEPHONE ENCOUNTER
[facility] Emergency Department/Urgent Care Lab result notification:    [place] ED lab result protocol used  culture    Reason for call  Notify of lab results, assess symptoms,  review ED providers recommendations/discharge instructions (if necessary) and advise per ED lab result f/u protocol    Lab Result   Final abscess Aerobic Bacterial Culture (specimen - breast, left) report on 3/16/22  [facility] Emergency Dept discharge antibiotic prescribed: None (On Keflex prior to ED visit)  #1. Bacteria, Methicillin resistant Staphylococcus aureus (MRSA),  is [RESISTANT] to antibiotic.  Incision and Drainage performed in the [place] ED: yes  Recommendations in treatment per [facility] ED lab result culture protocol    Information table from Emergency Dept Provider visit on 3?13/22  Symptoms reported at ED visit (Chief complaint, HPI) Chief Complaint   Patient presents with     Breast Pain       Left breast pain. Has been present 3 day since her Clinic visit.      The history is provided by the patient and medical records. The history is limited by a language barrier. A  was used (ipad).      Maureen Lebron is a 41 year old female with a past medical history significant for granulomatous mastitis who presents to the Emergency Department for evaluation of breast pain. Patient reports 7 days of left breast pain due to a growing mass on the outside of the breast. She states she went to the clinic on 3/11/22 and was discharged with a cephalexin prescription but was told to come to the ED if the mass does not drain. Patient is currently nursing and last nursed yesterday, but states there is minimal output from the left breast and does not feel engorged. She has not taken any medication for the pain. She denies fever, nausea, vomiting, chills, or lumps in the armpit.         ED providers Impression and Plan (applicable information) Maureen is a 41-year-old female who presents to  the emergency department with left breast pain, redness, swelling and tenderness.  On exam patient has what appears to be a palpable abscess with fluid collection.  Evidence of recent spontaneous drainage but not actively draining.     On exam she appears uncomfortable but nontoxic; denies systemic symptoms.  Discussed the case with Dr. Lewis who is on-call for surgery tonight. Plan is to transfer to the [facility] for general surgery consult and management.  Antibiotics will be deferred at this time and left to the discretion of the general surgery service.  Dr. Lewis did request CBC and Covid testing prior to transfer.  Patient was transferred was accepted by Dr. Jeffrey of the [place] emergency department who will consult general surgery upon patient's arrival.  While in the ER she got Toradol and Tylenol for pain control.      Miscellaneous information NA     RN Assessment (Patient s current Symptoms), include time called.  [Insert Left message here if message left]  At 11:25A,  The sx's are getting better.    I am still packing the wound  She states she was followed up in clinic yesterday by [facility] (associated with [facility]).  Maureen is unsure if her doctor saw the culture result or not.  No change in treatment    RN Recommendations/Instructions per [place] ED lab result protocol  Patient notified of lab result and treatment recommendations.    RN contacted [facility] clinic and spoke with clinic nurse.  The clinic nurse requested the abscess culture faxed to them at [phone], attn, SHIV Macario, CNM.  Result faxed at 11:46AM    Please Contact your PCP clinic or return to the Emergency department if your:    Symptoms worsen or other concerning symptom's.       Rigo Oglesby RN  [email or web address] [place]  Emergency Dept Lab Result RN  Ph# [phone]     Copy of Lab result   Abscess Aerobic Bacterial Culture Routine  Order:  [id number]   Collected 3/13/2022  3:53 AM     Status: Final result     Visible to patient: No (inaccessible in MyChart)    Specimen Information: Breast, Left; Abscess         4 Result Notes    Culture 2+ Staphylococcus aureus MRSA Abnormal             Resulting Agency: [facility]       Susceptibility     Staphylococcus aureus MRSA     DONALD     Clindamycin <=0.25 ug/mL Susceptible     Erythromycin <=0.25 ug/mL Susceptible     Gentamicin <=0.5 ug/mL Susceptible     Linezolid 2.0 ug/mL Susceptible     Oxacillin >=4.0 ug/mL Resistant 1     Tetracycline <=1.0 ug/mL Susceptible     Trimethoprim/Sulfamethoxazole <=0.5/9.5 u... Susceptible     Vancomycin 1.0 ug/mL Susceptible              1 Oxacillin susceptible isolates are susceptible to cephalosporins (example: cefazolin and cephalexin) and beta lactam combination agents. Oxacillin resistant isolates are resistant to these agents.        Susceptibility Comments    Staphylococcus aureus MRSA   MRSA requires contact precautions.         Specimen Collected: 03/13/22  3:53 AM Last Resulted: 03/16/22  8:02 AM

## 2022-03-17 NOTE — RESULT ENCOUNTER NOTE
Result faxed to [facility] clinic.  Attasge Hsu, APRN,   See telephone encounter.

## 2022-03-20 LAB — BACTERIA ABSC ANAEROBE+AEROBE CULT: NORMAL
